# Patient Record
Sex: MALE | Race: WHITE | NOT HISPANIC OR LATINO | Employment: OTHER | URBAN - METROPOLITAN AREA
[De-identification: names, ages, dates, MRNs, and addresses within clinical notes are randomized per-mention and may not be internally consistent; named-entity substitution may affect disease eponyms.]

---

## 2018-10-10 ENCOUNTER — HOSPITAL ENCOUNTER (INPATIENT)
Facility: HOSPITAL | Age: 53
LOS: 2 days | Discharge: HOME | End: 2018-10-12
Attending: EMERGENCY MEDICINE | Admitting: INTERNAL MEDICINE

## 2018-10-10 ENCOUNTER — APPOINTMENT (OUTPATIENT)
Dept: GENERAL RADIOLOGY | Facility: HOSPITAL | Age: 53
End: 2018-10-10

## 2018-10-10 DIAGNOSIS — I21.3 ST ELEVATION MYOCARDIAL INFARCTION (STEMI), UNSPECIFIED ARTERY (HCC): Primary | ICD-10-CM

## 2018-10-10 PROBLEM — G89.29 CHRONIC BACK PAIN: Status: ACTIVE | Noted: 2018-10-10

## 2018-10-10 PROBLEM — M54.9 CHRONIC BACK PAIN: Status: ACTIVE | Noted: 2018-10-10

## 2018-10-10 PROBLEM — I10 HTN (HYPERTENSION): Status: ACTIVE | Noted: 2018-10-10

## 2018-10-10 PROBLEM — E78.5 HLD (HYPERLIPIDEMIA): Status: ACTIVE | Noted: 2018-10-10

## 2018-10-10 LAB
ALBUMIN SERPL-MCNC: 4.69 G/DL (ref 3.2–4.8)
ALBUMIN/GLOB SERPL: 2.2 G/DL (ref 1.5–2.5)
ALP SERPL-CCNC: 50 U/L (ref 25–100)
ALT SERPL W P-5'-P-CCNC: 26 U/L (ref 7–40)
ANION GAP SERPL CALCULATED.3IONS-SCNC: 12 MMOL/L (ref 3–11)
AST SERPL-CCNC: 29 U/L (ref 0–33)
BASOPHILS # BLD AUTO: 0.03 10*3/MM3 (ref 0–0.2)
BASOPHILS NFR BLD AUTO: 0.2 % (ref 0–1)
BILIRUB SERPL-MCNC: 1.6 MG/DL (ref 0.3–1.2)
BNP SERPL-MCNC: 102 PG/ML (ref 0–100)
BUN BLD-MCNC: 24 MG/DL (ref 9–23)
BUN/CREAT SERPL: 17.4 (ref 7–25)
CALCIUM SPEC-SCNC: 9.3 MG/DL (ref 8.7–10.4)
CHLORIDE SERPL-SCNC: 96 MMOL/L (ref 99–109)
CO2 SERPL-SCNC: 28 MMOL/L (ref 20–31)
CREAT BLD-MCNC: 1.38 MG/DL (ref 0.6–1.3)
DEPRECATED RDW RBC AUTO: 40.5 FL (ref 37–54)
EOSINOPHIL # BLD AUTO: 0.06 10*3/MM3 (ref 0–0.3)
EOSINOPHIL NFR BLD AUTO: 0.5 % (ref 0–3)
ERYTHROCYTE [DISTWIDTH] IN BLOOD BY AUTOMATED COUNT: 12.8 % (ref 11.3–14.5)
GFR SERPL CREATININE-BSD FRML MDRD: 54 ML/MIN/1.73
GLOBULIN UR ELPH-MCNC: 2.1 GM/DL
GLUCOSE BLD-MCNC: 157 MG/DL (ref 70–100)
HCT VFR BLD AUTO: 47.6 % (ref 38.9–50.9)
HGB BLD-MCNC: 16.2 G/DL (ref 13.1–17.5)
HOLD SPECIMEN: NORMAL
HOLD SPECIMEN: NORMAL
IMM GRANULOCYTES # BLD: 0.05 10*3/MM3 (ref 0–0.03)
IMM GRANULOCYTES NFR BLD: 0.4 % (ref 0–0.6)
LIPASE SERPL-CCNC: 30 U/L (ref 6–51)
LYMPHOCYTES # BLD AUTO: 1.38 10*3/MM3 (ref 0.6–4.8)
LYMPHOCYTES NFR BLD AUTO: 11.1 % (ref 24–44)
MCH RBC QN AUTO: 29.7 PG (ref 27–31)
MCHC RBC AUTO-ENTMCNC: 34 G/DL (ref 32–36)
MCV RBC AUTO: 87.2 FL (ref 80–99)
MONOCYTES # BLD AUTO: 1.51 10*3/MM3 (ref 0–1)
MONOCYTES NFR BLD AUTO: 12.1 % (ref 0–12)
NEUTROPHILS # BLD AUTO: 9.47 10*3/MM3 (ref 1.5–8.3)
NEUTROPHILS NFR BLD AUTO: 76.1 % (ref 41–71)
PLATELET # BLD AUTO: 168 10*3/MM3 (ref 150–450)
PMV BLD AUTO: 12.3 FL (ref 6–12)
POTASSIUM BLD-SCNC: 3.8 MMOL/L (ref 3.5–5.5)
PROT SERPL-MCNC: 6.8 G/DL (ref 5.7–8.2)
RBC # BLD AUTO: 5.46 10*6/MM3 (ref 4.2–5.76)
SODIUM BLD-SCNC: 136 MMOL/L (ref 132–146)
TROPONIN I SERPL-MCNC: 6.3 NG/ML
WBC NRBC COR # BLD: 12.45 10*3/MM3 (ref 3.5–10.8)
WHOLE BLOOD HOLD SPECIMEN: NORMAL
WHOLE BLOOD HOLD SPECIMEN: NORMAL

## 2018-10-10 PROCEDURE — 99233 SBSQ HOSP IP/OBS HIGH 50: CPT | Performed by: INTERNAL MEDICINE

## 2018-10-10 PROCEDURE — C1725 CATH, TRANSLUMIN NON-LASER: HCPCS | Performed by: INTERNAL MEDICINE

## 2018-10-10 PROCEDURE — 92941 PRQ TRLML REVSC TOT OCCL AMI: CPT | Performed by: INTERNAL MEDICINE

## 2018-10-10 PROCEDURE — 99223 1ST HOSP IP/OBS HIGH 75: CPT | Performed by: INTERNAL MEDICINE

## 2018-10-10 PROCEDURE — C9606 PERC D-E COR REVASC W AMI S: HCPCS | Performed by: INTERNAL MEDICINE

## 2018-10-10 PROCEDURE — 85025 COMPLETE CBC W/AUTO DIFF WBC: CPT | Performed by: EMERGENCY MEDICINE

## 2018-10-10 PROCEDURE — C1769 GUIDE WIRE: HCPCS | Performed by: INTERNAL MEDICINE

## 2018-10-10 PROCEDURE — B2111ZZ FLUOROSCOPY OF MULTIPLE CORONARY ARTERIES USING LOW OSMOLAR CONTRAST: ICD-10-PCS | Performed by: INTERNAL MEDICINE

## 2018-10-10 PROCEDURE — B240ZZ3 ULTRASONOGRAPHY OF SINGLE CORONARY ARTERY, INTRAVASCULAR: ICD-10-PCS | Performed by: INTERNAL MEDICINE

## 2018-10-10 PROCEDURE — 93458 L HRT ARTERY/VENTRICLE ANGIO: CPT | Performed by: INTERNAL MEDICINE

## 2018-10-10 PROCEDURE — C1874 STENT, COATED/COV W/DEL SYS: HCPCS | Performed by: INTERNAL MEDICINE

## 2018-10-10 PROCEDURE — 93005 ELECTROCARDIOGRAM TRACING: CPT | Performed by: EMERGENCY MEDICINE

## 2018-10-10 PROCEDURE — 83690 ASSAY OF LIPASE: CPT | Performed by: EMERGENCY MEDICINE

## 2018-10-10 PROCEDURE — C1753 CATH, INTRAVAS ULTRASOUND: HCPCS | Performed by: INTERNAL MEDICINE

## 2018-10-10 PROCEDURE — 99284 EMERGENCY DEPT VISIT MOD MDM: CPT

## 2018-10-10 PROCEDURE — C1894 INTRO/SHEATH, NON-LASER: HCPCS | Performed by: INTERNAL MEDICINE

## 2018-10-10 PROCEDURE — 92978 ENDOLUMINL IVUS OCT C 1ST: CPT | Performed by: INTERNAL MEDICINE

## 2018-10-10 PROCEDURE — 027034Z DILATION OF CORONARY ARTERY, ONE ARTERY WITH DRUG-ELUTING INTRALUMINAL DEVICE, PERCUTANEOUS APPROACH: ICD-10-PCS | Performed by: INTERNAL MEDICINE

## 2018-10-10 PROCEDURE — 25010000002 BIVALIRUDIN TRIFLUOROACETATE 250 MG RECONSTITUTED SOLUTION 1 EACH VIAL: Performed by: INTERNAL MEDICINE

## 2018-10-10 PROCEDURE — 71045 X-RAY EXAM CHEST 1 VIEW: CPT

## 2018-10-10 PROCEDURE — 84484 ASSAY OF TROPONIN QUANT: CPT | Performed by: EMERGENCY MEDICINE

## 2018-10-10 PROCEDURE — 4A023N7 MEASUREMENT OF CARDIAC SAMPLING AND PRESSURE, LEFT HEART, PERCUTANEOUS APPROACH: ICD-10-PCS | Performed by: INTERNAL MEDICINE

## 2018-10-10 PROCEDURE — 80053 COMPREHEN METABOLIC PANEL: CPT | Performed by: EMERGENCY MEDICINE

## 2018-10-10 PROCEDURE — B2151ZZ FLUOROSCOPY OF LEFT HEART USING LOW OSMOLAR CONTRAST: ICD-10-PCS | Performed by: INTERNAL MEDICINE

## 2018-10-10 PROCEDURE — 83880 ASSAY OF NATRIURETIC PEPTIDE: CPT | Performed by: EMERGENCY MEDICINE

## 2018-10-10 PROCEDURE — 25010000002 HEPARIN (PORCINE) PER 1000 UNITS: Performed by: EMERGENCY MEDICINE

## 2018-10-10 PROCEDURE — 25010000002 FENTANYL CITRATE (PF) 100 MCG/2ML SOLUTION: Performed by: INTERNAL MEDICINE

## 2018-10-10 PROCEDURE — 25010000002 MIDAZOLAM PER 1 MG: Performed by: INTERNAL MEDICINE

## 2018-10-10 PROCEDURE — C1887 CATHETER, GUIDING: HCPCS | Performed by: INTERNAL MEDICINE

## 2018-10-10 DEVICE — XIENCE SIERRA™ EVEROLIMUS ELUTING CORONARY STENT SYSTEM 3.00 MM X 23 MM / RAPID-EXCHANGE
Type: IMPLANTABLE DEVICE | Status: FUNCTIONAL
Brand: XIENCE SIERRA™

## 2018-10-10 RX ORDER — NITROGLYCERIN 0.4 MG/1
TABLET SUBLINGUAL
Status: DISCONTINUED | OUTPATIENT
Start: 2018-10-10 | End: 2018-10-12 | Stop reason: HOSPADM

## 2018-10-10 RX ORDER — SODIUM CHLORIDE 9 MG/ML
1 INJECTION, SOLUTION INTRAVENOUS CONTINUOUS
Status: ACTIVE | OUTPATIENT
Start: 2018-10-10 | End: 2018-10-11

## 2018-10-10 RX ORDER — TEMAZEPAM 15 MG/1
15 CAPSULE ORAL NIGHTLY PRN
COMMUNITY
End: 2018-11-07

## 2018-10-10 RX ORDER — NITROGLYCERIN 20 MG/100ML
INJECTION INTRAVENOUS
Status: DISCONTINUED | OUTPATIENT
Start: 2018-10-10 | End: 2018-10-12 | Stop reason: HOSPADM

## 2018-10-10 RX ORDER — ONDANSETRON 4 MG/1
4 TABLET, FILM COATED ORAL EVERY 6 HOURS PRN
Status: DISCONTINUED | OUTPATIENT
Start: 2018-10-10 | End: 2018-10-12 | Stop reason: HOSPADM

## 2018-10-10 RX ORDER — GABAPENTIN 600 MG/1
600 TABLET ORAL 3 TIMES DAILY
COMMUNITY

## 2018-10-10 RX ORDER — ASPIRIN 81 MG/1
81 TABLET, CHEWABLE ORAL DAILY
Status: DISCONTINUED | OUTPATIENT
Start: 2018-10-11 | End: 2018-10-12 | Stop reason: HOSPADM

## 2018-10-10 RX ORDER — ACETAMINOPHEN 325 MG/1
650 TABLET ORAL EVERY 4 HOURS PRN
Status: DISCONTINUED | OUTPATIENT
Start: 2018-10-10 | End: 2018-10-12 | Stop reason: HOSPADM

## 2018-10-10 RX ORDER — ONDANSETRON 2 MG/ML
4 INJECTION INTRAMUSCULAR; INTRAVENOUS EVERY 6 HOURS PRN
Status: DISCONTINUED | OUTPATIENT
Start: 2018-10-10 | End: 2018-10-12 | Stop reason: HOSPADM

## 2018-10-10 RX ORDER — ALPRAZOLAM 0.25 MG/1
0.25 TABLET ORAL 3 TIMES DAILY PRN
Status: DISCONTINUED | OUTPATIENT
Start: 2018-10-10 | End: 2018-10-12 | Stop reason: HOSPADM

## 2018-10-10 RX ORDER — SODIUM CHLORIDE 0.9 % (FLUSH) 0.9 %
10 SYRINGE (ML) INJECTION AS NEEDED
Status: DISCONTINUED | OUTPATIENT
Start: 2018-10-10 | End: 2018-10-12 | Stop reason: HOSPADM

## 2018-10-10 RX ORDER — ASPIRIN 81 MG/1
324 TABLET, CHEWABLE ORAL ONCE
Status: DISCONTINUED | OUTPATIENT
Start: 2018-10-10 | End: 2018-10-11

## 2018-10-10 RX ORDER — FAMOTIDINE 20 MG/1
20 TABLET, FILM COATED ORAL DAILY
Status: DISCONTINUED | OUTPATIENT
Start: 2018-10-10 | End: 2018-10-12 | Stop reason: HOSPADM

## 2018-10-10 RX ORDER — CLOPIDOGREL BISULFATE 75 MG/1
TABLET ORAL
Status: DISCONTINUED | OUTPATIENT
Start: 2018-10-10 | End: 2018-10-12 | Stop reason: HOSPADM

## 2018-10-10 RX ORDER — DIPHENHYDRAMINE HYDROCHLORIDE 50 MG/ML
25 INJECTION INTRAMUSCULAR; INTRAVENOUS EVERY 6 HOURS PRN
Status: DISCONTINUED | OUTPATIENT
Start: 2018-10-10 | End: 2018-10-12 | Stop reason: HOSPADM

## 2018-10-10 RX ORDER — MIDAZOLAM HYDROCHLORIDE 1 MG/ML
INJECTION INTRAMUSCULAR; INTRAVENOUS AS NEEDED
Status: DISCONTINUED | OUTPATIENT
Start: 2018-10-10 | End: 2018-10-10 | Stop reason: HOSPADM

## 2018-10-10 RX ORDER — ALLOPURINOL 100 MG/1
100 TABLET ORAL DAILY PRN
COMMUNITY

## 2018-10-10 RX ORDER — COLCHICINE 0.6 MG/1
0.6 TABLET ORAL DAILY PRN
COMMUNITY

## 2018-10-10 RX ORDER — SODIUM CHLORIDE 9 MG/ML
INJECTION, SOLUTION INTRAVENOUS CONTINUOUS PRN
Status: COMPLETED | OUTPATIENT
Start: 2018-10-10 | End: 2018-10-10

## 2018-10-10 RX ORDER — FENTANYL CITRATE 50 UG/ML
INJECTION, SOLUTION INTRAMUSCULAR; INTRAVENOUS AS NEEDED
Status: DISCONTINUED | OUTPATIENT
Start: 2018-10-10 | End: 2018-10-10 | Stop reason: HOSPADM

## 2018-10-10 RX ORDER — HEPARIN SODIUM 5000 [USP'U]/ML
INJECTION, SOLUTION INTRAVENOUS; SUBCUTANEOUS
Status: DISCONTINUED | OUTPATIENT
Start: 2018-10-10 | End: 2018-10-12 | Stop reason: HOSPADM

## 2018-10-10 RX ORDER — HYDROCODONE BITARTRATE AND ACETAMINOPHEN 10; 325 MG/1; MG/1
1 TABLET ORAL 3 TIMES DAILY
COMMUNITY

## 2018-10-10 RX ORDER — CALCIUM CARBONATE 200(500)MG
2 TABLET,CHEWABLE ORAL 2 TIMES DAILY PRN
Status: DISCONTINUED | OUTPATIENT
Start: 2018-10-10 | End: 2018-10-12 | Stop reason: HOSPADM

## 2018-10-10 RX ORDER — HYDROCODONE BITARTRATE AND ACETAMINOPHEN 5; 325 MG/1; MG/1
1 TABLET ORAL EVERY 4 HOURS PRN
Status: DISCONTINUED | OUTPATIENT
Start: 2018-10-10 | End: 2018-10-12 | Stop reason: HOSPADM

## 2018-10-10 RX ORDER — LIDOCAINE HYDROCHLORIDE 10 MG/ML
INJECTION, SOLUTION EPIDURAL; INFILTRATION; INTRACAUDAL; PERINEURAL AS NEEDED
Status: DISCONTINUED | OUTPATIENT
Start: 2018-10-10 | End: 2018-10-10 | Stop reason: HOSPADM

## 2018-10-10 RX ADMIN — TICAGRELOR 90 MG: 90 TABLET ORAL at 20:43

## 2018-10-10 RX ADMIN — HYDROCODONE BITARTRATE AND ACETAMINOPHEN 1 TABLET: 5; 325 TABLET ORAL at 20:43

## 2018-10-10 RX ADMIN — SODIUM CHLORIDE 1 ML/KG/HR: 9 INJECTION, SOLUTION INTRAVENOUS at 18:45

## 2018-10-10 RX ADMIN — CLOPIDOGREL BISULFATE 300 MG: 75 TABLET ORAL at 16:23

## 2018-10-10 RX ADMIN — HEPARIN SODIUM 7320 UNITS: 5000 INJECTION, SOLUTION INTRAVENOUS; SUBCUTANEOUS at 16:24

## 2018-10-10 RX ADMIN — FAMOTIDINE 20 MG: 20 TABLET ORAL at 20:43

## 2018-10-10 RX ADMIN — NITROGLYCERIN 0.4 MG: 0.4 TABLET SUBLINGUAL at 16:25

## 2018-10-10 RX ADMIN — NITROGLYCERIN 10 MCG/MIN: 20 INJECTION INTRAVENOUS at 16:26

## 2018-10-10 RX ADMIN — METOPROLOL TARTRATE 12.5 MG: 25 TABLET, FILM COATED ORAL at 20:44

## 2018-10-10 NOTE — PROGRESS NOTES
Intensive Care Follow-up      LOS: 0 days     Mr. Lenny Esparza, 53 y.o. male is followed for: ST elevation myocardial infarction (STEMI) (CMS/HCC)     Subjective - Interval History     Mr. Esparza has a PMH significant for lymphoma (in remission per patient), HTN and hyperlipidemia. He has been intolerant to Simvistatin, rosuvastatin, and pravastatin due to severe myalgias. He has had  HLD since he was a teenager and has tried to control through diet.     Over the past week he has been awakened on a nightly basis with chest tightness that would resolve without intervention. He had some exertional chest pain which did not radiate and resolved with rest . The pain was associated with shortness of breath and diaphoresis but no nausea or vomiting. He has had extreme fatigue in the past couple of weeks as well. The only family history of CAD was his mother who  2 years ago at the age of 70 due to MI. He was out of town working and forgot to his BP medicine and did not take it for a week.     On Monday he had fever and chills and went to see his primary doctor today. EKG was suspicious for STEMI and he was sent to ED and then to cath lab where a JULI was placed in the LAD. He was also found to have a 60% RCA and will have intervention per Dr. Andre on approximately 4 weeks. EF was 50%. At the time of heart cath.      He denies wheezing, cough, palpitations,     The patient's relevant past medical, surgical and social history were reviewed and updated in Epic as appropriate.     Objective     Infusions:    nitroglycerin  Last Rate: Stopped (10/10/18 4511)   sodium chloride 1 mL/kg/hr      Medications:    aspirin 324 mg Oral Once   [START ON 10/11/2018] aspirin 81 mg Oral Daily   metoprolol tartrate 12.5 mg Oral Q12H   ticagrelor 90 mg Oral BID     Intake/Output     None        Vital Sign Min/Max for last 24 hours  Temp  Min: 98.3 °F (36.8 °C)  Max: 98.3 °F (36.8 °C)   BP  Min: 119/74  Max: 149/80   Pulse  Min: 78  Max:  97   Resp  Min: 18  Max: 18   SpO2  Min: 93 %  Max: 96 %   No Data Recorded        Physical Exam:   GENERAL: Lying in bed in NAD   HEENT: No JVD   LUNGS: Clear and equal bilaterally   HEART: Regular rate and rhythm, S1 S2, no murmur, rub, or gallop   ABDOMEN: Soft, non-tender, non-distended, active bowel sounds   EXTREMITIES: No clubbing, cyanosis, or edema, palpable pulses, right wrist cath site with TR band in place, no hematoma or bleeding   NEURO/PSYCH: Alert and oriented with no focal neurologic deficits      Results from last 7 days  Lab Units 10/10/18  1622   WBC 10*3/mm3 12.45*   HEMOGLOBIN g/dL 16.2   PLATELETS 10*3/mm3 168       Results from last 7 days  Lab Units 10/10/18  1622   SODIUM mmol/L 136   POTASSIUM mmol/L 3.8   CO2 mmol/L 28.0   BUN mg/dL 24*   CREATININE mg/dL 1.38*   GLUCOSE mg/dL 157*     Estimated Creatinine Clearance: 84.2 mL/min (A) (by C-G formula based on SCr of 1.38 mg/dL (H)).              No results found for: LACTATE       Images:   Xr Chest 1 View    Result Date: 10/10/2018  No acute cardiopulmonary abnormality.  DICTATED:   10/10/2018 EDITED/ls :   10/10/2018           I reviewed the patient's results and images.     Impression      Active Hospital Problems    Diagnosis   • **ST elevation myocardial infarction (STEMI) (CMS/HCC)   • HTN (hypertension)   • HLD (hyperlipidemia)   • Chronic back pain            Plan        STEMI- Monitor in ICU, dual antiplatelet therapy (Brilinta/ASA), BBl per cardiology. Monitor for arrythmia. Await echocardiogram results. Lifestyle modifications, especially discontinuing use of smokeless tobacco products, control of HLD.  Follow troponins.     HTN- BBl, possible ACE-I per cardiology    HLD- Intolerant to statin drugs due to myalgias, possible candidate for Repatha    Chronic back pain- Continue home Norco and gabapentin    Elevated creatinine- Fluids, follow trend    GI prophylaxis     Plan of care and goals reviewed with mulitdisciplinary team at  daily rounds   I discussed the patient's findings and my recommendations with patient, family and nursing staff       LINNETTE Rivera, [facility]  Pulmonary & Critical Care    I have seen and examined the patient, performing a face-to-face diagnostic evaluation with plan of care reviewed and developed with APRN and nursing staff. I have addended and modified the above history of present illness, physical examination, and assessment and plan to reflect my findings and impressions.    Ethan Jackson MD  Pulmonology and Critical Care Medicine  10/10/18 7:30 PM  Electronically Signed

## 2018-10-10 NOTE — H&P
[facility] History and Physical Note    Lenny Esparza  [id number]  1965   LOS: 0 days   Patient Care Team:  Provider, No Known as PCP - General    Mr. Esparza is a 52-year-old  white male from [place], [place].    Chief Complaint:  Chest pain, STEMI         Problem List:  1. STEMI 10/10/18  2. Hypertension  3. Hyperlipidemia    No Known Allergies  No prescriptions prior to admission.     Scheduled Meds:  Continuous Infusions:  No current facility-administered medications for this encounter.   PRN Meds:.       History of Present Illness:    This is a 53-year-old white male who presents with a STEMI.  He apparently had a STEMI on ECG at outside facility earlier today.  He elected to drive himself (possibly his wife was driving) to the [facility] ED to the cath lab for intervention.  He was given ASA, clopidogrel 300mg, and nitro at the clinic. The patient had been having chest pressure off and on with exertion for the past 6 days.  He had associated shortness of breath and diaphoresis, but denied any nausea, vomiting, palpitations, or presyncope.  He denies any prior cardiac problems.  He has hypertension and hyperlipidemia, but denies any type 2 diabetes mellitus.  He has no family history of early CAD.  He has never been a smoker. Procedure was explained to patient and he was agreeable to proceed. He does not have a prior cardiologist, but his wife thought that he may have seen Dr. Gr in the past-data deficit.  There are no records in Epic indicating this.    Cardiac risk factors: dyslipidemia, hypertension, male gender and obesity (BMI >= 30 kg/m2).    Social History     Social History   • Marital status:      Spouse name: N/A   • Number of children: N/A   • Years of education: N/A     Occupational History   • Not on file.     Social History Main Topics   • Smoking status: Not on file   • Smokeless tobacco: Not on file   • Alcohol use Not on file   • Drug use: Unknown   • Sexual activity:  Not on file     Other Topics Concern   • Not on file     Social History Narrative   • No narrative on file     No family history on file.    Review of Systems  Pertinent items are noted in HPI and problem list.          Objective:       Physical Exam  /80, HR 97, RR 18, O2 sat 96% on room air, weight 122kg, height 180cm    General Appearance:  Alert, cooperative, no distress, appears stated age   Head:  Normocephalic, without obvious abnormality, atraumatic   Neck: Supple, symmetrical, trachea midline, no adenopathy, thyroid: not enlarged, symmetric, no tenderness/mass/nodules, no carotid bruit or JVD   Lungs:   Clear to auscultation bilaterally, respirations unlabored   Heart:  RRR, S1, S2 normal, no murmur, rub or gallop   Abdomen:   Soft, non-tender, no masses, no organomegaly, bowel sounds audible x4   Extremities: No edema, normal range of motion   Pulses: 2+ and symmetric   Skin: Skin color, texture, turgor normal, no rashes or lesions   Neurologic: Normal       Cardi:ographics  EKG:10/10/18:Sinus rhythm with premature supraventricular complexes and with occasional premature ventricular complexes  Right bundle branch block  Inferior infarct , age undetermined  Anteroseptal infarct , age undetermined  Abnormal ECG  No previous ECGs available  ECG at OSH 10/10/18: Sinus rhythm, leftward axis, RBBB, inferior infarct, lateral ST elevation, 77 bpm,  ms,  ms,  ms    Imaging  Chest x-ray:none to review    Lab Review   Pending at time of note writing                  Assessment:   - STEMI and ongoing chest pain.   - History of hypertension  - History of dyslipidemia         Plan:     - Emergent C    Scribed for Giovanny Andre MD by LINNETTE Dodge. 10/10/2018  4:43 PM    I, Giovanny Andre MD, personally performed the services as scribed by the above named individual. I have made any necessary edits and it is both accurate and complete.     Giovanny Andre MD, MSc,  [facility]  Interventional Cardiology  [facility]    Addendum  Heart cath results and recommendations:  IMPRESSION:  · There is a 100% stenosis of the mid LAD that is now status post intervention with a Xience Summer 3.0 x 23 mm drug-eluting stent and post dilatation with a 3.5 mm noncompliant balloon.  · There is a residual 60% tubular proximal RCA stenosis as well as a Alonso class (1,1,1) bifurcation stenosis in the distal RCA at the bifurcation of the RPL S and RPDA   · Left ventricular ejection fraction 50% with hypokinesis of the anterior and apical segments.  · Frequent PVCs noted throughout the study    RECOMMENDATIONS:  · Clinical monitoring in the intensive care unit with close attention to the patient's rhythm on telemetry due to frequent PVCs and in the setting of an anterior wall MI  · Transthoracic echocardiogram  · Normal saline at 1 ml/hour for 6 hours in the setting of an elevated creatinine  · Dual antiplatelet therapy  · Beta blocker therapy  · Possible ACE inhibitor therapy depending on kidney function and echocardiogram results  · Discussion of candidacy for a PCS K-9 inhibitor in view of a previous intolerance to 3 different statins including simvastatin, rosuvastatin, pravastatin  · Cardiac rehabilitation  · Aggressive lifestyle risk factor modifications for CAD  · Staged intervention of the RCA in approximately 4 weeks     Giovanny Andre MD, MSc, [facility]  Interventional Cardiology  [facility]

## 2018-10-10 NOTE — ED PROVIDER NOTES
Subjective   Lenny Esparza is a 53 y.o.male who presents to the emergency department with complaints of substernal and left sided chest pain, tightness, and pressure which started last week and has been off and on for the last few days but continuous since this morning. He was seen by his PCP this morning who wanted to call EMS but the patient refused and drove about 80 miles from [place], [place] with his wife. He received one sublingual Nitroglycerin, 300 mg of Plavix, 81 mg of aspirin, and a GI cocktail at the office prior to arrival. He has been diaphoretic but denies nausea or shortness of breath. The only medication that he takes on a regular basis is Lisinopril for hypertension. There are no other acute complaints at this time.        History provided by:  Patient and spouse  History limited by:  Acuity of condition  Chest Pain   Pain location:  L chest and substernal area  Pain quality: pressure and tightness    Pain radiates to:  Does not radiate  Pain severity:  Moderate  Onset quality:  Gradual  Duration:  1 week  Timing:  Intermittent  Progression:  Worsening  Chronicity:  New  Relieved by:  Nothing  Worsened by:  Nothing  Ineffective treatments:  Aspirin and nitroglycerin (Plavix, GI cocktail)  Associated symptoms: diaphoresis    Associated symptoms: no nausea and no shortness of breath    Risk factors: hypertension and male sex        Review of Systems   Unable to perform ROS: Acuity of condition   Constitutional: Positive for diaphoresis.   Respiratory: Negative for shortness of breath.    Cardiovascular: Positive for chest pain.   Gastrointestinal: Negative for nausea.       No past medical history on file.    No Known Allergies    No past surgical history on file.    No family history on file.    Social History     Social History   • Marital status:      Social History Main Topics   • Drug use: Unknown     Other Topics Concern   • Not on file         Objective   Physical Exam   Constitutional: He  is oriented to person, place, and time. He appears well-developed and well-nourished. No distress.   Mildly diaphoretic.   HENT:   Head: Normocephalic and atraumatic.   Eyes: Conjunctivae are normal. No scleral icterus.   Neck: Normal range of motion. Neck supple.   Cardiovascular: Normal rate, regular rhythm and normal heart sounds.    No murmur heard.  Pulmonary/Chest: Effort normal and breath sounds normal. No respiratory distress.   Abdominal: Soft. Bowel sounds are normal. There is no tenderness. There is no rebound and no guarding.   Musculoskeletal: He exhibits no edema.   Neurological: He is alert and oriented to person, place, and time.   Skin: Skin is warm. He is diaphoretic. No erythema.   Warm. Normal color.   Psychiatric: He has a normal mood and affect. His behavior is normal.   Nursing note and vitals reviewed.      Critical Care  Performed by: DEMETRI FERNANDEZ  Authorized by: DEMETRI FERNANDEZ     Critical care provider statement:     Critical care time (minutes):  15    Critical care time was exclusive of:  Separately billable procedures and treating other patients    Critical care was necessary to treat or prevent imminent or life-threatening deterioration of the following conditions:  Cardiac failure    Critical care was time spent personally by me on the following activities:  Evaluation of patient's response to treatment, examination of patient, obtaining history from patient or surrogate, ordering and performing treatments and interventions, ordering and review of laboratory studies, ordering and review of radiographic studies, re-evaluation of patient's condition, development of treatment plan with patient or surrogate, pulse oximetry and discussions with consultants               ED Course     Recent Results (from the past 24 hour(s))   Comprehensive Metabolic Panel    Collection Time: 10/10/18  4:22 PM   Result Value Ref Range    Glucose 157 (H) 70 - 100 mg/dL    BUN 24 (H) 9 - 23 mg/dL     Creatinine 1.38 (H) 0.60 - 1.30 mg/dL    Sodium 136 132 - 146 mmol/L    Potassium 3.8 3.5 - 5.5 mmol/L    Chloride 96 (L) 99 - 109 mmol/L    CO2 28.0 20.0 - 31.0 mmol/L    Calcium 9.3 8.7 - 10.4 mg/dL    Total Protein 6.8 5.7 - 8.2 g/dL    Albumin 4.69 3.20 - 4.80 g/dL    ALT (SGPT) 26 7 - 40 U/L    AST (SGOT) 29 0 - 33 U/L    Alkaline Phosphatase 50 25 - 100 U/L    Total Bilirubin 1.6 (H) 0.3 - 1.2 mg/dL    eGFR Non African Amer 54 (L) >60 mL/min/1.73    Globulin 2.1 gm/dL    A/G Ratio 2.2 1.5 - 2.5 g/dL    BUN/Creatinine Ratio 17.4 7.0 - 25.0    Anion Gap 12.0 (H) 3.0 - 11.0 mmol/L   Lipase    Collection Time: 10/10/18  4:22 PM   Result Value Ref Range    Lipase 30 6 - 51 U/L   BNP    Collection Time: 10/10/18  4:22 PM   Result Value Ref Range    .0 (H) 0.0 - 100.0 pg/mL   CBC Auto Differential    Collection Time: 10/10/18  4:22 PM   Result Value Ref Range    WBC 12.45 (H) 3.50 - 10.80 10*3/mm3    RBC 5.46 4.20 - 5.76 10*6/mm3    Hemoglobin 16.2 13.1 - 17.5 g/dL    Hematocrit 47.6 38.9 - 50.9 %    MCV 87.2 80.0 - 99.0 fL    MCH 29.7 27.0 - 31.0 pg    MCHC 34.0 32.0 - 36.0 g/dL    RDW 12.8 11.3 - 14.5 %    RDW-SD 40.5 37.0 - 54.0 fl    MPV 12.3 (H) 6.0 - 12.0 fL    Platelets 168 150 - 450 10*3/mm3    Neutrophil % 76.1 (H) 41.0 - 71.0 %    Lymphocyte % 11.1 (L) 24.0 - 44.0 %    Monocyte % 12.1 (H) 0.0 - 12.0 %    Eosinophil % 0.5 0.0 - 3.0 %    Basophil % 0.2 0.0 - 1.0 %    Immature Grans % 0.4 0.0 - 0.6 %    Neutrophils, Absolute 9.47 (H) 1.50 - 8.30 10*3/mm3    Lymphocytes, Absolute 1.38 0.60 - 4.80 10*3/mm3    Monocytes, Absolute 1.51 (H) 0.00 - 1.00 10*3/mm3    Eosinophils, Absolute 0.06 0.00 - 0.30 10*3/mm3    Basophils, Absolute 0.03 0.00 - 0.20 10*3/mm3    Immature Grans, Absolute 0.05 (H) 0.00 - 0.03 10*3/mm3   Troponin    Collection Time: 10/10/18  4:22 PM   Result Value Ref Range    Troponin I 6.303 (C) <=0.039 ng/mL     Note: In addition to lab results from this visit, the labs listed above may  "include labs taken at another facility or during a different encounter within the last 24 hours. Please correlate lab times with ED admission and discharge times for further clarification of the services performed during this visit.    XR Chest 1 View    (Results Pending)     Vitals:    10/10/18 1615 10/10/18 1626 10/10/18 1633   BP:  149/80 140/67   Pulse:  97 89   Resp:  18 18   SpO2:  96% 93%   Weight: 122 kg (268 lb)     Height: 180.3 cm (71\")       Medications   sodium chloride 0.9 % flush 10 mL (not administered)   aspirin chewable tablet 324 mg (324 mg Oral Not Given 10/10/18 1620)   clopidogrel (PLAVIX) tablet (300 mg Oral Given 10/10/18 1623)   heparin (porcine) 5000 UNIT/ML injection (7,320 Units Intravenous Given 10/10/18 1624)   nitroglycerin (NITROSTAT) SL tablet (0.4 mg Sublingual Given 10/10/18 1625)   nitroglycerin 50 mg/250 mL (0.2 mg/mL) infusion (10 mcg/min Intravenous New Bag 10/10/18 1626)   lidocaine PF 1% (XYLOCAINE) injection (5 mL  Given 10/10/18 1637)   nicardipine (CARDENE) 100 MCG/ mcg, nitroglycerin 400 mcg radial artery injection ( Intra-arterial Given 10/10/18 1638)   midazolam (VERSED) injection (1 mg Intravenous Given 10/10/18 1657)   fentaNYL citrate (PF) (SUBLIMAZE) injection (25 mcg Intravenous Given 10/10/18 1656)   sodium chloride 0.9 % infusion (250 mL Intravenous New Bag 10/10/18 1648)   O2 (OXYGEN) (2 L Inhalation Given 10/10/18 1649)   bivalirudin (ANGIOMAX) bolus from bag (91.5 mg Intravenous Given 10/10/18 1654)   Bivalirudin Trifluoroacetate (ANGIOMAX) 250 mg in sodium chloride 0.9 % 100 mL (2.5 mg/mL) infusion (1.75 mg/kg/hr × 122 kg Intravenous New Bag 10/10/18 1656)   nitroglycerin 100 mcg/mL 30 mL syringe (200 mcg Intra-coronary Given 10/10/18 1659)     ECG/EMG Results (last 24 hours)     Procedure Component Value Units Date/Time    ECG 12 Lead [[id number]] Collected:  10/10/18 1614     Updated:  10/10/18 1615         ECG 12 Lead   Final Result   Test Reason : " chest pain   Blood Pressure : **/** mmHG   Vent. Rate : 085 BPM     Atrial Rate : 085 BPM      P-R Int : 130 ms          QRS Dur : 120 ms       QT Int : 384 ms       P-R-T Axes : -04 -28 010 degrees      QTc Int : 456 ms      Sinus rhythm with premature supraventricular complexes and with occasional       premature ventricular complexes   Right bundle branch block   Inferior infarct , age undetermined   Anteroseptal infarct , age undetermined   Abnormal ECG   No previous ECGs available   Confirmed by DEMETRI FERNANDEZ MD ([id number]) on 10/10/2018 9:29:56 PM      Referred By:  EDMD           Confirmed By:DEMETRI FERNANDEZ MD                      MDM  Number of Diagnoses or Management Options  Critical Care  Total time providing critical care: < 30 minutes      Final diagnoses:   ST elevation myocardial infarction (STEMI), unspecified artery (CMS/HCC)       Documentation assistance provided by nargis Mason.  Information recorded by the scribe was done at my direction and has been verified and validated by me.     Yamileth Mason  10/10/18 7451       Demetri Fernandez MD  10/10/18 6220

## 2018-10-11 ENCOUNTER — APPOINTMENT (OUTPATIENT)
Dept: CARDIOLOGY | Facility: HOSPITAL | Age: 53
End: 2018-10-11
Attending: INTERNAL MEDICINE

## 2018-10-11 LAB
ANION GAP SERPL CALCULATED.3IONS-SCNC: 7 MMOL/L (ref 3–11)
ARTICHOKE IGE QN: 105 MG/DL (ref 0–130)
BUN BLD-MCNC: 19 MG/DL (ref 9–23)
BUN/CREAT SERPL: 17.8 (ref 7–25)
CALCIUM SPEC-SCNC: 8.6 MG/DL (ref 8.7–10.4)
CHLORIDE SERPL-SCNC: 102 MMOL/L (ref 99–109)
CHOLEST SERPL-MCNC: 146 MG/DL (ref 0–200)
CO2 SERPL-SCNC: 25 MMOL/L (ref 20–31)
CREAT BLD-MCNC: 1.07 MG/DL (ref 0.6–1.3)
DEPRECATED RDW RBC AUTO: 40.6 FL (ref 37–54)
ERYTHROCYTE [DISTWIDTH] IN BLOOD BY AUTOMATED COUNT: 12.7 % (ref 11.3–14.5)
GFR SERPL CREATININE-BSD FRML MDRD: 73 ML/MIN/1.73
GLUCOSE BLD-MCNC: 118 MG/DL (ref 70–100)
HBA1C MFR BLD: 6.3 % (ref 4.8–5.6)
HCT VFR BLD AUTO: 44.1 % (ref 38.9–50.9)
HDLC SERPL-MCNC: 28 MG/DL (ref 40–60)
HGB BLD-MCNC: 14.7 G/DL (ref 13.1–17.5)
MCH RBC QN AUTO: 29.2 PG (ref 27–31)
MCHC RBC AUTO-ENTMCNC: 33.3 G/DL (ref 32–36)
MCV RBC AUTO: 87.5 FL (ref 80–99)
PLATELET # BLD AUTO: 129 10*3/MM3 (ref 150–450)
PMV BLD AUTO: 12 FL (ref 6–12)
POTASSIUM BLD-SCNC: 3.9 MMOL/L (ref 3.5–5.5)
RBC # BLD AUTO: 5.04 10*6/MM3 (ref 4.2–5.76)
SODIUM BLD-SCNC: 134 MMOL/L (ref 132–146)
TRIGL SERPL-MCNC: 113 MG/DL (ref 0–150)
WBC NRBC COR # BLD: 8.81 10*3/MM3 (ref 3.5–10.8)

## 2018-10-11 PROCEDURE — 93321 DOPPLER ECHO F-UP/LMTD STD: CPT | Performed by: INTERNAL MEDICINE

## 2018-10-11 PROCEDURE — 85027 COMPLETE CBC AUTOMATED: CPT | Performed by: INTERNAL MEDICINE

## 2018-10-11 PROCEDURE — 93321 DOPPLER ECHO F-UP/LMTD STD: CPT

## 2018-10-11 PROCEDURE — 93325 DOPPLER ECHO COLOR FLOW MAPG: CPT | Performed by: INTERNAL MEDICINE

## 2018-10-11 PROCEDURE — 93325 DOPPLER ECHO COLOR FLOW MAPG: CPT

## 2018-10-11 PROCEDURE — 99232 SBSQ HOSP IP/OBS MODERATE 35: CPT | Performed by: INTERNAL MEDICINE

## 2018-10-11 PROCEDURE — 93308 TTE F-UP OR LMTD: CPT | Performed by: INTERNAL MEDICINE

## 2018-10-11 PROCEDURE — 93005 ELECTROCARDIOGRAM TRACING: CPT | Performed by: INTERNAL MEDICINE

## 2018-10-11 PROCEDURE — 93308 TTE F-UP OR LMTD: CPT

## 2018-10-11 PROCEDURE — 83036 HEMOGLOBIN GLYCOSYLATED A1C: CPT | Performed by: INTERNAL MEDICINE

## 2018-10-11 PROCEDURE — 25010000002 SULFUR HEXAFLUORIDE MICROSPH 60.7-25 MG RECONSTITUTED SUSPENSION: Performed by: INTERNAL MEDICINE

## 2018-10-11 PROCEDURE — 80061 LIPID PANEL: CPT | Performed by: INTERNAL MEDICINE

## 2018-10-11 PROCEDURE — 93010 ELECTROCARDIOGRAM REPORT: CPT | Performed by: INTERNAL MEDICINE

## 2018-10-11 PROCEDURE — 80048 BASIC METABOLIC PNL TOTAL CA: CPT | Performed by: INTERNAL MEDICINE

## 2018-10-11 RX ORDER — GABAPENTIN 400 MG/1
400 CAPSULE ORAL EVERY 8 HOURS SCHEDULED
Status: DISCONTINUED | OUTPATIENT
Start: 2018-10-11 | End: 2018-10-12 | Stop reason: HOSPADM

## 2018-10-11 RX ORDER — TEMAZEPAM 15 MG/1
15 CAPSULE ORAL NIGHTLY PRN
Status: CANCELLED | OUTPATIENT
Start: 2018-10-11

## 2018-10-11 RX ORDER — LISINOPRIL 5 MG/1
5 TABLET ORAL
Status: DISCONTINUED | OUTPATIENT
Start: 2018-10-12 | End: 2018-10-12 | Stop reason: HOSPADM

## 2018-10-11 RX ORDER — CLOPIDOGREL BISULFATE 75 MG/1
75 TABLET ORAL DAILY
Status: DISCONTINUED | OUTPATIENT
Start: 2018-10-12 | End: 2018-10-12 | Stop reason: HOSPADM

## 2018-10-11 RX ADMIN — ALPRAZOLAM 0.25 MG: 0.25 TABLET ORAL at 22:33

## 2018-10-11 RX ADMIN — ASPIRIN 81 MG 81 MG: 81 TABLET ORAL at 08:32

## 2018-10-11 RX ADMIN — GABAPENTIN 400 MG: 400 CAPSULE ORAL at 14:12

## 2018-10-11 RX ADMIN — GABAPENTIN 400 MG: 400 CAPSULE ORAL at 22:33

## 2018-10-11 RX ADMIN — HYDROCODONE BITARTRATE AND ACETAMINOPHEN 1 TABLET: 5; 325 TABLET ORAL at 22:33

## 2018-10-11 RX ADMIN — METOPROLOL TARTRATE 12.5 MG: 25 TABLET, FILM COATED ORAL at 20:06

## 2018-10-11 RX ADMIN — TICAGRELOR 90 MG: 90 TABLET ORAL at 08:33

## 2018-10-11 RX ADMIN — HYDROCODONE BITARTRATE AND ACETAMINOPHEN 1 TABLET: 5; 325 TABLET ORAL at 11:49

## 2018-10-11 RX ADMIN — ALPRAZOLAM 0.25 MG: 0.25 TABLET ORAL at 00:40

## 2018-10-11 RX ADMIN — METOPROLOL TARTRATE 12.5 MG: 25 TABLET, FILM COATED ORAL at 08:32

## 2018-10-11 RX ADMIN — SULFUR HEXAFLUORIDE 4 ML: KIT at 11:10

## 2018-10-11 RX ADMIN — HYDROCODONE BITARTRATE AND ACETAMINOPHEN 1 TABLET: 5; 325 TABLET ORAL at 18:34

## 2018-10-11 RX ADMIN — TICAGRELOR 90 MG: 90 TABLET ORAL at 20:06

## 2018-10-11 RX ADMIN — RIVAROXABAN 20 MG: 20 TABLET, FILM COATED ORAL at 20:06

## 2018-10-11 RX ADMIN — FAMOTIDINE 20 MG: 20 TABLET ORAL at 08:32

## 2018-10-11 NOTE — PROGRESS NOTES
Brief Note  -LV thrombus on TTE  -Will switch to ASA, clopidogrel and Xarelto for the time being  -Last dose of ticagrelor tonight, start Xarelto tonight, start clopidogrel tomorrow    Giovanny Andre MD, MSc, [facility]  Interventional Cardiology  [facility]

## 2018-10-11 NOTE — PLAN OF CARE
Problem: Patient Care Overview  Goal: Plan of Care Review  Outcome: Ongoing (interventions implemented as appropriate)   10/11/18 8882   Coping/Psychosocial   Plan of Care Reviewed With patient;spouse   Plan of Care Review   Progress improving   OTHER   Outcome Summary Pt. VSS. Intermittent pain r/t chronic low back pain. Home meds restarted. SR/SB with ST elevation and frequent PVCs. Orders to telemetry.     Goal: Individualization and Mutuality  Outcome: Ongoing (interventions implemented as appropriate)    Goal: Discharge Needs Assessment  Outcome: Ongoing (interventions implemented as appropriate)    Goal: Interprofessional Rounds/Family Conf  Outcome: Ongoing (interventions implemented as appropriate)

## 2018-10-11 NOTE — PLAN OF CARE
Problem: Skin Injury Risk (Adult)  Goal: Identify Related Risk Factors and Signs and Symptoms  Outcome: Ongoing (interventions implemented as appropriate)   10/11/18 0450   Skin Injury Risk (Adult)   Related Risk Factors (Skin Injury Risk) critical care admission     Goal: Skin Health and Integrity  Outcome: Ongoing (interventions implemented as appropriate)   10/11/18 0450   Skin Injury Risk (Adult)   Skin Health and Integrity making progress toward outcome       Problem: Patient Care Overview  Goal: Plan of Care Review  Outcome: Ongoing (interventions implemented as appropriate)   10/11/18 0450   Coping/Psychosocial   Plan of Care Reviewed With patient;spouse   Plan of Care Review   Progress improving   OTHER   Outcome Summary pt doing well this shift. VSS. right radial artery has no bleeding or hematoma. good pulses and cap refill. denies numbness or tingling. has been NSR on monitor with BBB and ST elevation with frequent PVCs. recieved PRN norco for c/o chronic back pain. will cont. to monitor.       Problem: Cardiac Catheterization (Diagnostic/Interventional) (Adult)  Goal: Signs and Symptoms of Listed Potential Problems Will be Absent, Minimized or Managed (Cardiac Catheterization)  Outcome: Ongoing (interventions implemented as appropriate)   10/11/18 0450   Goal/Outcome Evaluation   Problems Assessed (Cardiac Catheterization) all   Problems Present (Cardiac Cath) situational response     Goal: Anesthesia/Sedation Recovery  Outcome: Outcome(s) achieved Date Met: 10/11/18

## 2018-10-11 NOTE — PROGRESS NOTES
Clinical Nutrition Note      Patient Name: Lenny Esparza  MRN: [id number]  Admission date: 10/10/2018      Reason for Assessment:  Multidisciplinary Rounds    Additional information obtained during MDR: RN reports patient admitted with STEMI s/p JULI to LAD. Tolerating current diet. Patient and family requesting heart-healthy nutrition education prior to discharge.    Current diet: Diet Regular; Cardiac    Pertinent medical data reviewed    Intervention:  Plan of care and goals reviewed; Menu provided; Advised alternate options    Monitor:  RD to follow per protocol      Grisel Reddy  10/11/18 1:38 PM  Time: 20min

## 2018-10-11 NOTE — PROGRESS NOTES
Discharge Planning Assessment  [facility]     Patient Name: Lenny Esparza  MRN: [id number]  Today's Date: 10/11/2018    Admit Date: 10/10/2018          Discharge Needs Assessment     Row Name 10/11/18 0808       Living Environment    Lives With spouse    Name(s) of Who Lives With Patient Mikayla spouse  [phone]    Current Living Arrangements home/apartment/condo    Primary Care Provided by self    Provides Primary Care For no one    Family Caregiver if Needed spouse    Family Caregiver Names Mikayla    Quality of Family Relationships involved;helpful;supportive    Able to Return to Prior Arrangements yes    Living Arrangement Comments Pt lives with spouse in 1 level home in [facility].       Resource/Environmental Concerns    Resource/Environmental Concerns none       Transition Planning    Patient/Family Anticipates Transition to home with family    Patient/Family Anticipated Services at Transition     Transportation Anticipated family or friend will provide       Discharge Needs Assessment    Readmission Within the Last 30 Days no previous admission in last 30 days    Concerns to be Addressed no discharge needs identified    Equipment Currently Used at Home none    Anticipated Changes Related to Illness none    Equipment Needed After Discharge none            Discharge Plan     Row Name 10/11/18 0812       Plan    Plan Home with spouse    Patient/Family in Agreement with Plan yes    Plan Comments Met with patient and wife at . Pt was independent with ADL's prior to this admission. Pt has not had HH or DME. Pt sees Dr Whitney PCP.Pt has [employer] insurance for his medication.    Final Discharge Disposition Code 01 - home or self-care        Destination     No service coordination in this encounter.      Durable Medical Equipment     No service coordination in this encounter.      Dialysis/Infusion     No service coordination in this encounter.      Home Medical Care     No service  coordination in this encounter.      Social Care     No service coordination in this encounter.        Expected Discharge Date and Time     Expected Discharge Date Expected Discharge Time    Oct 14, 2018               Demographic Summary     Row Name 10/11/18 0807       General Information    Admission Type inpatient    Arrived From emergency department    Referral Source admission list    Reason for Consult discharge planning       Contact Information    Permission Granted to Share Info With             Functional Status     Row Name 10/11/18 0807       Functional Status    Usual Activity Tolerance excellent       Functional Status, IADL    Medications independent    Meal Preparation independent    Housekeeping independent    Laundry independent    Shopping independent            Psychosocial    No documentation.           Abuse/Neglect    No documentation.           Legal    No documentation.           Substance Abuse    No documentation.           Patient Forms    No documentation.         Annette Hines RN

## 2018-10-11 NOTE — PROGRESS NOTES
"INTENSIVIST NOTE     Hospital Day: 1      Mr. Lenny Esparza, 53 y.o. male is followed for:   STEMI and management of comorbid medical conditions        SUBJECTIVE     53-year-old male with a past history of lymphoma, hypertension, and dyslipidemia who presented with a weeklong history of crescendo type chest pain.  He presented to our emergency department on 10/10 with EKG consistent with STEMI and underwent a drug-eluting stent placement to the LAD.  Ejection fraction was 50%.    Interval history:    Denies chest pain or shortness of breath.  Afebrile.  Fluid balance -1.5 L.  Frequent ectopic beats but overriding rhythm is sinus    The patient's relevant past medical, surgical and social history were reviewed and updated in Epic as appropriate.       OBJECTIVE     Vital Sign Min/Max for last 24 hours  Temp  Min: 97.9 °F (36.6 °C)  Max: 98.4 °F (36.9 °C)   BP  Min: 96/49  Max: 149/80   Pulse  Min: 71  Max: 97   Resp  Min: 14  Max: 20   SpO2  Min: 92 %  Max: 97 %   No Data Recorded   Weight  Min: 122 kg (268 lb)  Max: 122 kg (268 lb)      Intake/Output Summary (Last 24 hours) at 10/11/18 1131  Last data filed at 10/11/18 0800   Gross per 24 hour   Intake            813.4 ml   Output             2375 ml   Net          -1561.6 ml      Flowsheet Rows      First Filed Value   Admission Height  180.3 cm (71\") Documented at 10/10/2018 1615   Admission Weight  122 kg (268 lb) Documented at 10/10/2018 1615         1    10/10/18  1615   Weight: 122 kg (268 lb)            Objective:  General Appearance:  In no acute distress.    Vital signs: (most recent): Blood pressure 125/79, pulse 72, temperature 97.9 °F (36.6 °C), temperature source Oral, resp. rate 20, height 180.3 cm (71\"), weight 122 kg (268 lb), SpO2 93 %.    HEENT: Normal HEENT exam.    Lungs:  Normal effort and normal respiratory rate.  Breath sounds clear to auscultation.  He is not in respiratory distress.  No rales, wheezes or rhonchi.    Heart: Normal rate.  " Regular rhythm.  S1 normal and S2 normal.  No murmur, gallop or friction rub.   Chest: Symmetric chest wall expansion.   Abdomen: Abdomen is soft and non-distended.  Bowel sounds are normal.   There is no abdominal tenderness.   There is no mass. There is no splenomegaly. There is no hepatomegaly.   Extremities: There is no deformity or dependent edema.    Neurological: Patient is alert and oriented to person, place and time.    Pupils:  Pupils are equal, round, and reactive to light.    Skin:  Warm and dry.              I reviewed the patient's new clinical results.  I reviewed the patient's new imaging results/reports including actual images and agree with reports.      Chest X-Ray:  NAD    INFUSIONS    nitroglycerin  Last Rate: Stopped (10/10/18 1731)         Results from last 7 days  Lab Units 10/11/18  0453 10/10/18  1622   WBC 10*3/mm3 8.81 12.45*   HEMOGLOBIN g/dL 14.7 16.2   HEMATOCRIT % 44.1 47.6   PLATELETS 10*3/mm3 129* 168       Results from last 7 days  Lab Units 10/11/18  0453 10/10/18  1622   SODIUM mmol/L 134 136   POTASSIUM mmol/L 3.9 3.8   CHLORIDE mmol/L 102 96*   CO2 mmol/L 25.0 28.0   BUN mg/dL 19 24*   GLUCOSE mg/dL 118* 157*   CREATININE mg/dL 1.07 1.38*   CALCIUM mg/dL 8.6* 9.3                 [facility]      I reviewed the patient's medications.    Assessment/Plan   ASSESSMENT      Active Hospital Problems    Diagnosis   • **ST elevation myocardial infarction (STEMI) (CMS/HCC)   • HTN (hypertension)   • HLD (hyperlipidemia)   • Chronic back pain         53-year-old male with STEMI status post JULI to LAD         PLAN     1. Aspirin/Brilinta   2. Beta-blockade   3. Resume home Neurontin dose   4. Echocardiogram   5. Telemetry when cardiology ready          I discussed the patient's findings and my recommendations with patient and nursing staff     Plan of care and goals reviewed with multidisciplinary team at daily rounds.    Lev Quinteros MD  Pulmonary and Critical Care  Medicine  10/11/18 11:31 AM

## 2018-10-11 NOTE — PROGRESS NOTES
[facility]    Inpatient Progress Note      Chief Complaint/Reason for service:    · STEMI         Subjective:       The patient is without left-sided chest pain or radiating pain to the shoulder or neck today.  Much improved after intervention yesterday.  Denies associated trouble breathing.  No discomfort from his right radial artery access site.  Tolerating his medications without difficulty.    Past medical, surgical, social and family history reviewed in the patient's electronic medical record.    Review of Systems:   Negative for exertional chest pain, dyspnea with exertion, lower extremity edema, palpitations     Problem List  Active Hospital Problems    Diagnosis Date Noted   • **ST elevation myocardial infarction (STEMI) (CMS/HCC) [I21.3] 10/10/2018   • HTN (hypertension) [I10] 10/10/2018   • HLD (hyperlipidemia) [E78.5] 10/10/2018   • Chronic back pain [M54.9, G89.29] 10/10/2018      Resolved Hospital Problems    Diagnosis Date Noted Date Resolved   No resolved problems to display.            Objective:      Current Facility-Administered Medications:   •  acetaminophen (TYLENOL) tablet 650 mg, 650 mg, Oral, Q4H PRN, Giovanny Andre MD  •  ALPRAZolam (XANAX) tablet 0.25 mg, 0.25 mg, Oral, TID PRN, Giovanny Andre MD, 0.25 mg at 10/11/18 0040  •  aspirin chewable tablet 324 mg, 324 mg, Oral, Once, Phoenix Hays MD  •  aspirin chewable tablet 81 mg, 81 mg, Oral, Daily, Giovanny Andre MD, 81 mg at 10/11/18 0832  •  calcium carbonate (TUMS) chewable tablet 500 mg (200 mg elemental), 2 tablet, Oral, BID PRN, Giovanny Andre MD  •  [START ON 10/12/2018] clopidogrel (PLAVIX) tablet 75 mg, 75 mg, Oral, Daily, Giovanny Andre MD  •  clopidogrel (PLAVIX) tablet, , Oral, Code / Trauma / Sedation Medication, Phoenix Hays MD, 300 mg at 10/10/18 1623  •  diphenhydrAMINE (BENADRYL) injection 25 mg, 25 mg, Intravenous, Q6H PRN, Giovanny Andre MD  •  famotidine (PEPCID) tablet 20 mg,  20 mg, Oral, Daily, Caren Marshall, APRN, 20 mg at 10/11/18 0832  •  gabapentin (NEURONTIN) capsule 400 mg, 400 mg, Oral, Q8H, Lev Quinteros MD, 400 mg at 10/11/18 1412  •  heparin (porcine) 5000 UNIT/ML injection, , Intravenous, Code / Trauma / Sedation Medication, Phoenix Hays MD, 7,320 Units at 10/10/18 1624  •  HYDROcodone-acetaminophen (NORCO) 5-325 MG per tablet 1 tablet, 1 tablet, Oral, Q4H PRN, Giovanny Andre MD, 1 tablet at 10/11/18 1149  •  [START ON 10/12/2018] lisinopril (PRINIVIL,ZESTRIL) tablet 5 mg, 5 mg, Oral, Q24H, Giovanny Andre MD  •  metoprolol tartrate (LOPRESSOR) half tablet 12.5 mg, 12.5 mg, Oral, Q12H, Giovanny Andre MD, 12.5 mg at 10/11/18 0832  •  nitroglycerin (NITROSTAT) SL tablet, , Sublingual, Code / Trauma / Sedation Medication, Phoenix Hays MD, 0.4 mg at 10/10/18 1625  •  nitroglycerin 50 mg/250 mL (0.2 mg/mL) infusion, , Intravenous, Code / Trauma / Sedation Continuous Med, Phoenix Hays MD, Stopped at 10/10/18 1731  •  ondansetron (ZOFRAN) tablet 4 mg, 4 mg, Oral, Q6H PRN **OR** ondansetron (ZOFRAN) injection 4 mg, 4 mg, Intravenous, Q6H PRN, Giovanny Andre MD  •  Pharmacy Consult - [facility], , Does not apply, Daily, Flora Pichardo, [facility]  •  rivaroxaban (XARELTO) tablet 20 mg, 20 mg, Oral, Daily With Dinner, Giovanny Andre MD  •  sodium chloride 0.9 % flush 10 mL, 10 mL, Intravenous, PRN, Phoenix Hays MD  •  ticagrelor (BRILINTA) tablet 90 mg, 90 mg, Oral, BID, Giovanny Andre MD    Vital Sign Min/Max for last 24 hours  Temp  Min: 97.9 °F (36.6 °C)  Max: 98.5 °F (36.9 °C)   BP  Min: 96/49  Max: 149/80   Pulse  Min: 71  Max: 97   Resp  Min: 14  Max: 20   SpO2  Min: 84 %  Max: 97 %   No Data Recorded      Intake/Output Summary (Last 24 hours) at 10/11/18 1555  Last data filed at 10/11/18 1200   Gross per 24 hour   Intake            813.4 ml   Output             2650 ml   Net          -1836.6 ml           CONSTITUTIONAL: No acute distress, normal  affect  RESPIRATORY: Normal effort. Clear to auscultation bilaterally without wheezing or rales  CARDIOVASCULAR: Regular rate and rhythm with normal S1 and S2. Without murmur, gallop or rub.  PERIPHERAL VASCULAR: Normal radial pulses bilaterally. There is no peripheral edema bilaterally.  Right radial artery access site clean dry and intact    Results Review:   Lab Results   Component Value Date    TROPONINI 6.303 (C) 10/10/2018       BUN   Date Value Ref Range Status   10/11/2018 19 9 - 23 mg/dL Final     Creatinine   Date Value Ref Range Status   10/11/2018 1.07 0.60 - 1.30 mg/dL Final     Potassium   Date Value Ref Range Status   10/11/2018 3.9 3.5 - 5.5 mmol/L Final     ALT (SGPT)   Date Value Ref Range Status   10/10/2018 26 7 - 40 U/L Final     AST (SGOT)   Date Value Ref Range Status   10/10/2018 29 0 - 33 U/L Final       Lab Results   Component Value Date    CHOL 146 10/11/2018     Lab Results   Component Value Date    TRIG 113 10/11/2018     Lab Results   Component Value Date    HDL 28 (L) 10/11/2018     No components found for: LDLCALC  No results found for: VLDL  No results found for: LDLHDL    Tele:  NSR with frequent PVCs    C 10/10/18  · There is a 100% stenosis of the mid LAD that is now status post intervention with a Xience Summer 3.0 x 23 mm drug-eluting stent and post dilatation with a 3.5 mm noncompliant balloon.  · There is a residual 60% tubular proximal RCA stenosis as well as a Alonso class (1,1,1) bifurcation stenosis in the distal RCA at the bifurcation of the RPL S and RPDA   · Left ventricular ejection fraction 50% with hypokinesis of the anterior and apical segments.  · Frequent PVCs noted throughout the study    TTE 10/11/18  · Left ventricular systolic function is normal. Estimated EF = 50%.  · Left ventricular wall thickness is consistent with mild concentric hypertrophy.  · The left ventricular cavity is borderline dilated.  · The following left ventricular wall segments are  hypokinetic: mid anterior, apical anterior, apical inferior, apical septal, apex hypokinetic and mid anteroseptal.  · Left ventricular diastolic dysfunction (grade I a) consistent with impaired relaxation.  · A echogenic left ventricular thrombus is present. The left ventricular thrombus is fixed and located in the apex.         Assessment/Plan:     ASSESSMENT:  -STEMI, anterior, delayed presentation, CAD status post PCI to the LAD.  Residual RCA disease  -Frequent PVCs  -Mild systolic heart failure  -Essential hypertension  -Hyperlipidemia, previous intolerance due to myalgias on 3 statins including pravastatin, simvastatin, rosuvastatin   -LV thrombus    PLAN:  -Okay to transfer to telemetry.  Close monitoring of rhythm due to frequent PVCs in the setting of  delayed anterior STEMI   -Dual antiplatelet therapy with aspirin and clopidogrel.  Changing Ticagrelor to clopidogrel to avoid being on Ticagrelor and Xarelto at the same time  -Start Xarelto for LV thrombus on echocardiogram  -Start lisinopril due to mildly depressed LV function, monitor blood pressure  -Continue metoprolol  -Beginning paper work for the patient's PCS K-9 inhibitor candidacy  -Possible discharge tomorrow if clinically stable    Giovanny Andre MD, MSc, FACC  Interventional Cardiology  [facility]  10/11/2018

## 2018-10-12 VITALS
SYSTOLIC BLOOD PRESSURE: 132 MMHG | DIASTOLIC BLOOD PRESSURE: 82 MMHG | RESPIRATION RATE: 20 BRPM | BODY MASS INDEX: 37.52 KG/M2 | WEIGHT: 268 LBS | OXYGEN SATURATION: 95 % | TEMPERATURE: 97.8 F | HEART RATE: 70 BPM | HEIGHT: 71 IN

## 2018-10-12 LAB
ANION GAP SERPL CALCULATED.3IONS-SCNC: 7 MMOL/L (ref 3–11)
BH CV ECHO MEAS - AO ROOT AREA (BSA CORRECTED): 1.4
BH CV ECHO MEAS - AO ROOT AREA: 8.7 CM^2
BH CV ECHO MEAS - AO ROOT DIAM: 3.3 CM
BH CV ECHO MEAS - BSA(HAYCOCK): 2.5 M^2
BH CV ECHO MEAS - BSA: 2.4 M^2
BH CV ECHO MEAS - BZI_BMI: 37.4 KILOGRAMS/M^2
BH CV ECHO MEAS - BZI_METRIC_HEIGHT: 180.3 CM
BH CV ECHO MEAS - BZI_METRIC_WEIGHT: 121.6 KG
BH CV ECHO MEAS - EDV(CUBED): 146.6 ML
BH CV ECHO MEAS - EDV(MOD-SP2): 138 ML
BH CV ECHO MEAS - EDV(MOD-SP4): 168 ML
BH CV ECHO MEAS - EDV(TEICH): 133.8 ML
BH CV ECHO MEAS - EF(CUBED): 65.8 %
BH CV ECHO MEAS - EF(MOD-BP): 50 %
BH CV ECHO MEAS - EF(MOD-SP2): 47.8 %
BH CV ECHO MEAS - EF(MOD-SP4): 49.4 %
BH CV ECHO MEAS - EF(TEICH): 56.9 %
BH CV ECHO MEAS - ESV(CUBED): 50.2 ML
BH CV ECHO MEAS - ESV(MOD-SP2): 72 ML
BH CV ECHO MEAS - ESV(MOD-SP4): 85 ML
BH CV ECHO MEAS - ESV(TEICH): 57.7 ML
BH CV ECHO MEAS - FS: 30.1 %
BH CV ECHO MEAS - IVS/LVPW: 1
BH CV ECHO MEAS - IVSD: 1.2 CM
BH CV ECHO MEAS - LAT PEAK E' VEL: 8.9 CM/SEC
BH CV ECHO MEAS - LATERAL E/E' RATIO: 9.4
BH CV ECHO MEAS - LV DIASTOLIC VOL/BSA (35-75): 70.3 ML/M^2
BH CV ECHO MEAS - LV MASS(C)D: 264.4 GRAMS
BH CV ECHO MEAS - LV MASS(C)DI: 110.7 GRAMS/M^2
BH CV ECHO MEAS - LV SYSTOLIC VOL/BSA (12-30): 35.6 ML/M^2
BH CV ECHO MEAS - LVIDD: 5.3 CM
BH CV ECHO MEAS - LVIDS: 3.7 CM
BH CV ECHO MEAS - LVLD AP2: 9 CM
BH CV ECHO MEAS - LVLD AP4: 9.3 CM
BH CV ECHO MEAS - LVLS AP2: 9.2 CM
BH CV ECHO MEAS - LVLS AP4: 9.4 CM
BH CV ECHO MEAS - LVPWD: 1.2 CM
BH CV ECHO MEAS - MED PEAK E' VEL: 9.5 CM/SEC
BH CV ECHO MEAS - MEDIAL E/E' RATIO: 8.7
BH CV ECHO MEAS - MV A MAX VEL: 93.8 CM/SEC
BH CV ECHO MEAS - MV E MAX VEL: 84.9 CM/SEC
BH CV ECHO MEAS - MV E/A: 0.91
BH CV ECHO MEAS - PA ACC SLOPE: 547.1 CM/SEC^2
BH CV ECHO MEAS - PA ACC TIME: 0.11 SEC
BH CV ECHO MEAS - PA PR(ACCEL): 30.3 MMHG
BH CV ECHO MEAS - SI(CUBED): 40.4 ML/M^2
BH CV ECHO MEAS - SI(MOD-SP2): 27.6 ML/M^2
BH CV ECHO MEAS - SI(MOD-SP4): 34.8 ML/M^2
BH CV ECHO MEAS - SI(TEICH): 31.9 ML/M^2
BH CV ECHO MEAS - SV(CUBED): 96.5 ML
BH CV ECHO MEAS - SV(MOD-SP2): 66 ML
BH CV ECHO MEAS - SV(MOD-SP4): 83 ML
BH CV ECHO MEAS - SV(TEICH): 76.1 ML
BH CV ECHO MEAS - TAPSE (>1.6): 2.7 CM2
BH CV ECHO MEASUREMENTS AVERAGE E/E' RATIO: 9.23
BH CV VAS BP LEFT ARM: NORMAL MMHG
BH CV XLRA - RV BASE: 4.4 CM
BH CV XLRA - RV LENGTH: 9.1 CM
BH CV XLRA - RV MID: 3.2 CM
BH CV XLRA - TDI S': 12.6 CM/SEC
BUN BLD-MCNC: 17 MG/DL (ref 9–23)
BUN/CREAT SERPL: 16.5 (ref 7–25)
CALCIUM SPEC-SCNC: 9 MG/DL (ref 8.7–10.4)
CHLORIDE SERPL-SCNC: 100 MMOL/L (ref 99–109)
CO2 SERPL-SCNC: 30 MMOL/L (ref 20–31)
CREAT BLD-MCNC: 1.03 MG/DL (ref 0.6–1.3)
DEPRECATED RDW RBC AUTO: 40.2 FL (ref 37–54)
ERYTHROCYTE [DISTWIDTH] IN BLOOD BY AUTOMATED COUNT: 12.5 % (ref 11.3–14.5)
GFR SERPL CREATININE-BSD FRML MDRD: 76 ML/MIN/1.73
GLUCOSE BLD-MCNC: 182 MG/DL (ref 70–100)
HCT VFR BLD AUTO: 45.3 % (ref 38.9–50.9)
HGB BLD-MCNC: 15.1 G/DL (ref 13.1–17.5)
LV EF 2D ECHO EST: 50 %
MAXIMAL PREDICTED HEART RATE: 169 BPM
MCH RBC QN AUTO: 29.4 PG (ref 27–31)
MCHC RBC AUTO-ENTMCNC: 33.3 G/DL (ref 32–36)
MCV RBC AUTO: 88.1 FL (ref 80–99)
PLATELET # BLD AUTO: 152 10*3/MM3 (ref 150–450)
PMV BLD AUTO: 12 FL (ref 6–12)
POTASSIUM BLD-SCNC: 4.5 MMOL/L (ref 3.5–5.5)
RBC # BLD AUTO: 5.14 10*6/MM3 (ref 4.2–5.76)
SODIUM BLD-SCNC: 137 MMOL/L (ref 132–146)
STRESS TARGET HR: 144 BPM
WBC NRBC COR # BLD: 7.28 10*3/MM3 (ref 3.5–10.8)

## 2018-10-12 PROCEDURE — 80048 BASIC METABOLIC PNL TOTAL CA: CPT | Performed by: INTERNAL MEDICINE

## 2018-10-12 PROCEDURE — 99239 HOSP IP/OBS DSCHRG MGMT >30: CPT | Performed by: INTERNAL MEDICINE

## 2018-10-12 PROCEDURE — 85027 COMPLETE CBC AUTOMATED: CPT | Performed by: INTERNAL MEDICINE

## 2018-10-12 RX ORDER — NITROGLYCERIN 0.4 MG/1
TABLET SUBLINGUAL
Qty: 25 TABLET | Refills: 0 | Status: SHIPPED | OUTPATIENT
Start: 2018-10-12 | End: 2018-12-12 | Stop reason: SDUPTHER

## 2018-10-12 RX ORDER — CLOPIDOGREL BISULFATE 75 MG/1
75 TABLET ORAL DAILY
Qty: 30 TABLET | Refills: 11 | Status: SHIPPED | OUTPATIENT
Start: 2018-10-13 | End: 2022-08-29

## 2018-10-12 RX ORDER — ASPIRIN 81 MG/1
81 TABLET, CHEWABLE ORAL DAILY
Qty: 30 TABLET | Refills: 11 | Status: SHIPPED | OUTPATIENT
Start: 2018-10-13

## 2018-10-12 RX ORDER — LISINOPRIL 5 MG/1
5 TABLET ORAL
Qty: 30 TABLET | Refills: 11 | Status: SHIPPED | OUTPATIENT
Start: 2018-10-13 | End: 2018-11-08 | Stop reason: HOSPADM

## 2018-10-12 RX ADMIN — LISINOPRIL 5 MG: 5 TABLET ORAL at 08:00

## 2018-10-12 RX ADMIN — ASPIRIN 81 MG 81 MG: 81 TABLET ORAL at 08:00

## 2018-10-12 RX ADMIN — FAMOTIDINE 20 MG: 20 TABLET ORAL at 08:00

## 2018-10-12 RX ADMIN — GABAPENTIN 400 MG: 400 CAPSULE ORAL at 06:13

## 2018-10-12 RX ADMIN — HYDROCODONE BITARTRATE AND ACETAMINOPHEN 1 TABLET: 5; 325 TABLET ORAL at 06:13

## 2018-10-12 RX ADMIN — CLOPIDOGREL BISULFATE 75 MG: 75 TABLET ORAL at 08:00

## 2018-10-12 RX ADMIN — METOPROLOL TARTRATE 12.5 MG: 25 TABLET, FILM COATED ORAL at 08:00

## 2018-10-12 NOTE — PLAN OF CARE
Problem: Patient Care Overview  Goal: Plan of Care Review  Outcome: Ongoing (interventions implemented as appropriate)   10/12/18 3014   Coping/Psychosocial   Plan of Care Reviewed With patient   OTHER   Outcome Summary Pt arrived from [facility] via em[facility], NIHSS at time of arrival was a 1 due to sensation, Cardene started to keep sbp <140, stroke education provided, will cont to lottie       Problem: Cardiac Catheterization (Diagnostic/Interventional) (Adult)  Goal: Signs and Symptoms of Listed Potential Problems Will be Absent, Minimized or Managed (Cardiac Catheterization)  Outcome: Ongoing (interventions implemented as appropriate)

## 2018-10-12 NOTE — DISCHARGE SUMMARY
[facility]  DISCHARGE SUMMARY    Date of Discharge:  10/12/2018    Discharge Diagnosis: STEMI    Presenting Problem/History of Present Illness  ST elevation myocardial infarction (STEMI), unspecified artery (CMS/[facility]) [I21.3]      Hospital Course  Patient is a 53 y.o. male presented with STEMI on ECG at an outside facility on 10/10/18 and then drove to [facility] for further intervention.  He had been having chest pressure with exertion intermittently for 6 days prior to arrival, with associated shortness of breath and diaphoresis.  He underwent emergent cardiac catheterization and was noted to have a 100% stenosis of the mid LAD, 60% tubular proximal RCA stenosis as well as Alonso class (1,1,1) bifurcation stenosis in the distal RCA at the bifurcation of the RPL S and RPDA.  He was admitted to ICU and has had an uneventful recovery.  He was noted to have an echogenic left ventricular thrombus on echocardiogram and was started on Xarelto.  He will return on apporximately Novemeber 13 for a staged intervention of the bifurcation of the RPL S and RPDA as well as further functional assessment of the proximal to mid RCA.    Procedures Performed  Procedure(s):  Left Heart Cath       Consults:   Consults     No orders found from 9/11/2018 to 10/11/2018.          Pertinent Test Results:     TTE 10/11/2018  · Left ventricular systolic function is normal. Estimated EF = 50%.  · Left ventricular wall thickness is consistent with mild concentric hypertrophy.  · The left ventricular cavity is borderline dilated.  · The following left ventricular wall segments are hypokinetic: mid anterior, apical anterior, apical inferior, apical septal, apex hypokinetic and mid anteroseptal.  · Left ventricular diastolic dysfunction (grade I a) consistent with impaired relaxation.  · A echogenic left ventricular thrombus is present. The left ventricular thrombus is fixed and located in the apex.    [facility]  10/10/2018  ·  There is a 100% stenosis of the mid LAD that is now status post intervention with a Xience Summer 3.0 x 23 mm drug-eluting stent and post dilatation with a 3.5 mm noncompliant balloon.  · There is a residual 60% tubular proximal RCA stenosis as well as a Alonso class (1,1,1) bifurcation stenosis in the distal RCA at the bifurcation of the RPL S and RPDA   · Left ventricular ejection fraction 50% with hypokinesis of the anterior and apical segments.  · Frequent PVCs noted throughout the study    Condition on Discharge:  Stable    Physical Exam at Discharge  Vital Signs  Temp:  [97.8 °F (36.6 °C)-99.7 °F (37.6 °C)] 97.8 °F (36.6 °C)  Heart Rate:  [64-84] 70  Resp:  [16-20] 20  BP: (111-142)/(62-93) 132/82  Physical Exam:  CONSTITUTIONAL: No acute distress, normal affect.  RESPIRATORY: Normal effort. Clear to auscultation bilaterally without wheezing or rales.  CARDIOVASCULAR: Regular rate and rhythm with normal S1 and S2. Without murmur, gallop or rub.  PERIPHERAL VASCULAR: Normal radial pulses bilaterally. There is no peripheral edema bilaterally.  Right radial artery access site clean dry and intact.    Discharge Disposition: Home    Discharge Diet: Cardiac heart healthy diet    Activity at Discharge: As tolerated    Follow-up Appointments  No future appointments.  Will schedule follow-up with Dr. Andre in the cardiology clinic after the patient returns for staged intervention.  Additional Instructions for the Follow-ups that You Need to Schedule     Ambulatory Referral to Cardiac Rehab    As directed            Discharge Medications     Discharge Medications      New Medications      Instructions Start Date   aspirin 81 MG chewable tablet   81 mg, Oral, Daily      clopidogrel 75 MG tablet  Commonly known as:  PLAVIX   75 mg, Oral, Daily      lisinopril 5 MG tablet  Commonly known as:  PRINIVIL,ZESTRIL   5 mg, Oral, Every 24 Hours Scheduled      metoprolol tartrate 25 MG tablet  Commonly known as:   LOPRESSOR   12.5 mg, Oral, Every 12 Hours Scheduled      rivaroxaban 20 MG tablet  Commonly known as:  XARELTO   20 mg, Oral, Daily With Dinner         Continue These Medications      Instructions Start Date   allopurinol 100 MG tablet  Commonly known as:  ZYLOPRIM   100 mg, Oral, Daily PRN      colchicine 0.6 MG tablet   0.6 mg, Oral, Daily PRN      gabapentin 600 MG tablet  Commonly known as:  NEURONTIN   600 mg, Oral, 3 Times Daily      HYDROcodone-acetaminophen  MG per tablet  Commonly known as:  NORCO   1 tablet, Oral, 3 Times Daily      temazepam 15 MG capsule  Commonly known as:  RESTORIL   15 mg, Oral, Nightly PRN           LINNETTE Gonsalves obtained past medical, family history, social history, review of systems and functioned as a scribe for the remainder of the dictation for Dr. Andre.      10/12/18  11:42 AM    Time: Discharge 35 min     I, Giovanny Andre MD, personally performed the services as scribed by the above named individual. I have made any necessary edits and it is both accurate and complete.     Giovanny Andre MD, MSc, FAC  Interventional Cardiology  [facility]

## 2018-10-13 ENCOUNTER — READMISSION MANAGEMENT (OUTPATIENT)
Dept: CALL CENTER | Facility: HOSPITAL | Age: 53
End: 2018-10-13

## 2018-10-13 NOTE — OUTREACH NOTE
Prep Survey      Responses   Facility patient discharged from?  [facility]   Is patient eligible?  Yes   Discharge diagnosis  STEMI,  heart cath   Does the patient have one of the following disease processes/diagnoses(primary or secondary)?  Acute MI (STEMI,NSTEMI)   Does the patient have Home health ordered?  No   Is there a DME ordered?  No   Comments regarding appointments  call for apmt   Prep survey completed?  Yes          Alia Torre RN

## 2018-10-15 ENCOUNTER — DOCUMENTATION (OUTPATIENT)
Dept: CARDIAC REHAB | Facility: HOSPITAL | Age: 53
End: 2018-10-15

## 2018-10-17 ENCOUNTER — READMISSION MANAGEMENT (OUTPATIENT)
Dept: CALL CENTER | Facility: HOSPITAL | Age: 53
End: 2018-10-17

## 2018-10-17 NOTE — OUTREACH NOTE
AMI Week 1 Survey      Responses   Facility patient discharged from?  [facility]   Does the patient have one of the following disease processes/diagnoses(primary or secondary)?  Acute MI (STEMI,NSTEMI)   Is there a successful TCM telephone encounter documented?  No   Week 1 attempt successful?  Yes   Call start time  1338   Call end time  1355   Discharge diagnosis  STEMI,  heart cath   Is patient permission given to speak with other caregiver?  Yes   List who call center can speak with  Mikayla, spouse   Person spoke with today (if not patient) and relationship  Mikayla, spouse   Meds reviewed with patient/caregiver?  Yes   Is the patient having any side effects they believe may be caused by any medication additions or changes?  No   Does the patient have all prescriptions related to this admission filled (includes statins,anticoagulants,HTN meds,anti-arrhythmia meds)  Yes   Is the patient taking all medications as directed (includes completed medication regime)?  Yes   Does the patient have a primary care provider?   Yes   Does the patient have an appointment with their PCP,cardiologist,or clinic within 7 days of discharge?  Yes   Comments regarding PCP  PCP Dr Nair   Has the patient kept scheduled appointments due by today?  Yes   Comments  Wife states awaiting to be called for next procedure from Dr Andre's office.    Has home health visited the patient within 72 hours of discharge?  N/A   Psychosocial issues?  No   Did the patient receive a copy of their discharge instructions?  Yes   Nursing interventions  Reviewed instructions with patient   What is the patient's perception of their health status since discharge?  Improving   Nursing interventions  Nurse provided patient education   Is the patient/caregiver able to teach back signs and symptoms of when to call for help immediately:  Sudden chest discomfort, Sudden discomfort in arms, back, neck or jaw, Shortness of breath at any time, Sudden sweating or clammy  skin, Nausea or vomiting, Dizziness or lightheadedness, Irregular or rapid heart rate   Nursing interventions  Nurse provided patient education   Is the pateint /caregiver able to teach back the importance of cardiac rehab?  Yes   Nursing interventions  Provided education on importance of cardiac rehab [Wife states that patient will be starting cardiac rehab in [place]. ]   Is the patient/caregiver able to teach back lifestyle changes to help prevent MIs  Regular exercise as approved by provider, Heart healthy diet, Maintaining a healthy weight, Reducing stress   Is the patient/caregiver able to teach back ways to prevent a second heart attack:  Take medications, Follow up with MD, Participate in Cardiac Rehab, Manage risk factors   Is the patient/caregiver able to teach back the hierarchy of who to call/visit for symptoms/problems? PCP, Specialist, Home health nurse, Urgent Care, ED, 911  Yes   Week 1 call completed?  Yes          Candace Garcia RN

## 2018-10-23 ENCOUNTER — DOCUMENTATION (OUTPATIENT)
Dept: CARDIAC REHAB | Facility: HOSPITAL | Age: 53
End: 2018-10-23

## 2018-10-23 NOTE — PROGRESS NOTES
Pt. Referred for Phase II Cardiac Rehab. Staff discussed benefits of exercise, program protocol, and educational material provided. Teach back verified.  Patient has spoken with [facility] regarding program information and scheduling.  He will be having a staged PCI in the near future and will schedule after that procedure.

## 2018-10-24 ENCOUNTER — READMISSION MANAGEMENT (OUTPATIENT)
Dept: CALL CENTER | Facility: HOSPITAL | Age: 53
End: 2018-10-24

## 2018-10-24 NOTE — OUTREACH NOTE
AMI Week 2 Survey      Responses   Facility patient discharged from?  [place]   Does the patient have one of the following disease processes/diagnoses(primary or secondary)?  Acute MI (STEMI,NSTEMI)   Week 2 attempt successful?  Yes   Call start time  1551   Call end time  1557   Discharge diagnosis  STEMI,  heart cath   Meds reviewed with patient/caregiver?  Yes   Is the patient having any side effects they believe may be caused by any medication additions or changes?  No   Is the patient taking all medications as directed (includes completed medication regime)?  Yes   Has the patient kept scheduled appointments due by today?  Yes   Psychosocial issues?  No   Comments  /70 per pt,  LHC right radial no issues.    What is the patient's perception of their health status since discharge?  Improving   Nursing interventions  Nurse provided patient education   Is the patient/caregiver able to teach back signs and symptoms of when to call for help immediately:  Sudden chest discomfort, Dizziness or lightheadedness, Sudden discomfort in arms, back, neck or jaw, Shortness of breath at any time   Nursing interventions  Nurse provided patient education   Is the patient/caregiver able to teach back lifestyle changes to help prevent MIs  Regular exercise as approved by provider, Maintaining a healthy weight, Reducing stress   Is the patient/caregiver able to teach back ways to prevent a second heart attack:  Take medications, Follow up with MD   Is the patient/caregiver able to teach back the hierarchy of who to call/visit for symptoms/problems? PCP, Specialist, Home health nurse, Urgent Care, ED, 911  Yes   Week 2 call completed?  Yes          Liya Atkins RN

## 2018-11-01 ENCOUNTER — READMISSION MANAGEMENT (OUTPATIENT)
Dept: CALL CENTER | Facility: HOSPITAL | Age: 53
End: 2018-11-01

## 2018-11-01 ENCOUNTER — PREP FOR SURGERY (OUTPATIENT)
Dept: OTHER | Facility: HOSPITAL | Age: 53
End: 2018-11-01

## 2018-11-01 DIAGNOSIS — I25.10 CAD (CORONARY ARTERY DISEASE): Primary | ICD-10-CM

## 2018-11-01 DIAGNOSIS — E11.9 DIABETES MELLITUS (HCC): ICD-10-CM

## 2018-11-01 RX ORDER — SODIUM CHLORIDE 0.9 % (FLUSH) 0.9 %
3 SYRINGE (ML) INJECTION EVERY 12 HOURS SCHEDULED
Status: CANCELLED | OUTPATIENT
Start: 2018-11-01

## 2018-11-01 RX ORDER — SODIUM CHLORIDE 0.9 % (FLUSH) 0.9 %
3-10 SYRINGE (ML) INJECTION AS NEEDED
Status: CANCELLED | OUTPATIENT
Start: 2018-11-01

## 2018-11-01 RX ORDER — ASPIRIN 81 MG/1
324 TABLET, CHEWABLE ORAL ONCE
Status: CANCELLED | OUTPATIENT
Start: 2018-11-01 | End: 2018-11-01

## 2018-11-01 RX ORDER — ASPIRIN 81 MG/1
81 TABLET ORAL DAILY
Status: CANCELLED | OUTPATIENT
Start: 2018-11-02

## 2018-11-01 RX ORDER — ACETAMINOPHEN 325 MG/1
650 TABLET ORAL EVERY 4 HOURS PRN
Status: CANCELLED | OUTPATIENT
Start: 2018-11-01

## 2018-11-01 RX ORDER — ONDANSETRON 2 MG/ML
4 INJECTION INTRAMUSCULAR; INTRAVENOUS EVERY 6 HOURS PRN
Status: CANCELLED | OUTPATIENT
Start: 2018-11-01

## 2018-11-01 NOTE — OUTREACH NOTE
AMI Week 3 Survey      Responses   Facility patient discharged from?  [place]   Does the patient have one of the following disease processes/diagnoses(primary or secondary)?  Acute MI (STEMI,NSTEMI)   Week 3 attempt successful?  Yes   Call start time  1027   Call end time  1045   Discharge diagnosis  STEMI,  heart cath   Meds reviewed with patient/caregiver?  Yes   Is the patient taking all medications as directed (includes completed medication regime)?  Yes   Comments regarding appointments  Pt to come back to PAT on 11/7 then cath on 11/8. He asked about holding meds and i called [facility] and was given number for Dr Andre's office which I gave to pt. He should also receive a letter in mail about AC   Does the patient have a primary care provider?   Yes   Has the patient kept scheduled appointments due by today?  Yes   Has home health visited the patient within 72 hours of discharge?  N/A   Psychosocial issues?  No   Comments  Wrist site intact. Second cath on 11/8 scheduled for X2 blockages   What is the patient's perception of their health status since discharge?  Improving   Nursing interventions  Nurse provided patient education   Is the patient/caregiver able to teach back signs and symptoms of when to call for help immediately:  Sudden chest discomfort, Dizziness or lightheadedness, Sudden discomfort in arms, back, neck or jaw, Shortness of breath at any time, Sudden sweating or clammy skin, Nausea or vomiting   Nursing interventions  Nurse provided patient education   Is the pateint /caregiver able to teach back the importance of cardiac rehab?  Yes   Is the patient/caregiver able to teach back lifestyle changes to help prevent MIs  Regular exercise as approved by provider, Maintaining a healthy weight, Reducing stress   Is the patient/caregiver able to teach back ways to prevent a second heart attack:  Take medications, Follow up with MD   Is the patient/caregiver able to teach back the hierarchy of who to  call/visit for symptoms/problems? PCP, Specialist, Home health nurse, Urgent Care, ED, 911  Yes   Week 3 call completed?  Yes          Melani Thomas RN

## 2018-11-07 ENCOUNTER — APPOINTMENT (OUTPATIENT)
Dept: PREADMISSION TESTING | Facility: HOSPITAL | Age: 53
End: 2018-11-07

## 2018-11-07 DIAGNOSIS — I25.10 CAD (CORONARY ARTERY DISEASE): ICD-10-CM

## 2018-11-07 DIAGNOSIS — E11.9 DIABETES MELLITUS (HCC): ICD-10-CM

## 2018-11-07 LAB
ALBUMIN SERPL-MCNC: 4.6 G/DL (ref 3.2–4.8)
ALBUMIN/GLOB SERPL: 2.9 G/DL (ref 1.5–2.5)
ALP SERPL-CCNC: 57 U/L (ref 25–100)
ALT SERPL W P-5'-P-CCNC: 27 U/L (ref 7–40)
ANION GAP SERPL CALCULATED.3IONS-SCNC: 5 MMOL/L (ref 3–11)
AST SERPL-CCNC: 23 U/L (ref 0–33)
BILIRUB SERPL-MCNC: 0.8 MG/DL (ref 0.3–1.2)
BUN BLD-MCNC: 19 MG/DL (ref 9–23)
BUN/CREAT SERPL: 20.4 (ref 7–25)
CALCIUM SPEC-SCNC: 9.3 MG/DL (ref 8.7–10.4)
CHLORIDE SERPL-SCNC: 105 MMOL/L (ref 99–109)
CO2 SERPL-SCNC: 26 MMOL/L (ref 20–31)
CREAT BLD-MCNC: 0.93 MG/DL (ref 0.6–1.3)
DEPRECATED RDW RBC AUTO: 39.5 FL (ref 37–54)
ERYTHROCYTE [DISTWIDTH] IN BLOOD BY AUTOMATED COUNT: 12.7 % (ref 11.3–14.5)
GFR SERPL CREATININE-BSD FRML MDRD: 86 ML/MIN/1.73
GLOBULIN UR ELPH-MCNC: 1.6 GM/DL
GLUCOSE BLD-MCNC: 105 MG/DL (ref 70–100)
HCT VFR BLD AUTO: 49.2 % (ref 38.9–50.9)
HGB BLD-MCNC: 16.9 G/DL (ref 13.1–17.5)
INR PPP: 1.07 (ref 0.91–1.09)
MCH RBC QN AUTO: 29.4 PG (ref 27–31)
MCHC RBC AUTO-ENTMCNC: 34.3 G/DL (ref 32–36)
MCV RBC AUTO: 85.7 FL (ref 80–99)
PLATELET # BLD AUTO: 126 10*3/MM3 (ref 150–450)
PMV BLD AUTO: 12.7 FL (ref 6–12)
POTASSIUM BLD-SCNC: 4.6 MMOL/L (ref 3.5–5.5)
PROT SERPL-MCNC: 6.2 G/DL (ref 5.7–8.2)
PROTHROMBIN TIME: 11.2 SECONDS (ref 9.6–11.5)
RBC # BLD AUTO: 5.74 10*6/MM3 (ref 4.2–5.76)
SODIUM BLD-SCNC: 136 MMOL/L (ref 132–146)
WBC NRBC COR # BLD: 6.88 10*3/MM3 (ref 3.5–10.8)

## 2018-11-07 PROCEDURE — 85610 PROTHROMBIN TIME: CPT | Performed by: NURSE PRACTITIONER

## 2018-11-07 PROCEDURE — 80053 COMPREHEN METABOLIC PANEL: CPT | Performed by: NURSE PRACTITIONER

## 2018-11-07 PROCEDURE — 85027 COMPLETE CBC AUTOMATED: CPT | Performed by: NURSE PRACTITIONER

## 2018-11-07 PROCEDURE — 36415 COLL VENOUS BLD VENIPUNCTURE: CPT

## 2018-11-07 RX ORDER — ALPRAZOLAM 0.5 MG/1
0.5 TABLET ORAL NIGHTLY PRN
COMMUNITY

## 2018-11-07 RX ORDER — CHLORAL HYDRATE 500 MG
1000 CAPSULE ORAL
COMMUNITY
End: 2019-05-01

## 2018-11-07 NOTE — DISCHARGE INSTRUCTIONS

## 2018-11-08 ENCOUNTER — TELEPHONE (OUTPATIENT)
Dept: CARDIOLOGY | Facility: CLINIC | Age: 53
End: 2018-11-08

## 2018-11-08 ENCOUNTER — READMISSION MANAGEMENT (OUTPATIENT)
Dept: CALL CENTER | Facility: HOSPITAL | Age: 53
End: 2018-11-08

## 2018-11-08 ENCOUNTER — HOSPITAL ENCOUNTER (OUTPATIENT)
Facility: HOSPITAL | Age: 53
Discharge: HOME | End: 2018-11-08
Attending: INTERNAL MEDICINE | Admitting: INTERNAL MEDICINE

## 2018-11-08 VITALS
BODY MASS INDEX: 34.6 KG/M2 | RESPIRATION RATE: 18 BRPM | OXYGEN SATURATION: 99 % | TEMPERATURE: 97.7 F | SYSTOLIC BLOOD PRESSURE: 105 MMHG | HEIGHT: 71 IN | DIASTOLIC BLOOD PRESSURE: 61 MMHG | WEIGHT: 247.14 LBS | HEART RATE: 55 BPM

## 2018-11-08 DIAGNOSIS — I21.3 ST ELEVATION MYOCARDIAL INFARCTION (STEMI), UNSPECIFIED ARTERY (HCC): ICD-10-CM

## 2018-11-08 DIAGNOSIS — I25.10 CAD (CORONARY ARTERY DISEASE): ICD-10-CM

## 2018-11-08 DIAGNOSIS — I21.02 ST ELEVATION MYOCARDIAL INFARCTION INVOLVING LEFT ANTERIOR DESCENDING (LAD) CORONARY ARTERY (HCC): Primary | ICD-10-CM

## 2018-11-08 LAB
ACT BLD: 213 SECONDS (ref 82–152)
ACT BLD: 324 SECONDS (ref 82–152)
ACT BLD: 423 SECONDS (ref 82–152)
ARTICHOKE IGE QN: 121 MG/DL (ref 0–130)
CHOLEST SERPL-MCNC: 172 MG/DL (ref 0–200)
HDLC SERPL-MCNC: 34 MG/DL (ref 40–60)
TRIGL SERPL-MCNC: 121 MG/DL (ref 0–150)

## 2018-11-08 PROCEDURE — 92978 ENDOLUMINL IVUS OCT C 1ST: CPT

## 2018-11-08 PROCEDURE — A9270 NON-COVERED ITEM OR SERVICE: HCPCS | Performed by: NURSE PRACTITIONER

## 2018-11-08 PROCEDURE — C9600 PERC DRUG-EL COR STENT SING: HCPCS | Performed by: INTERNAL MEDICINE

## 2018-11-08 PROCEDURE — C1725 CATH, TRANSLUMIN NON-LASER: HCPCS | Performed by: INTERNAL MEDICINE

## 2018-11-08 PROCEDURE — 25010000002 FENTANYL CITRATE (PF) 100 MCG/2ML SOLUTION: Performed by: INTERNAL MEDICINE

## 2018-11-08 PROCEDURE — 93571 IV DOP VEL&/PRESS C FLO 1ST: CPT | Performed by: INTERNAL MEDICINE

## 2018-11-08 PROCEDURE — C1769 GUIDE WIRE: HCPCS | Performed by: INTERNAL MEDICINE

## 2018-11-08 PROCEDURE — C1887 CATHETER, GUIDING: HCPCS | Performed by: INTERNAL MEDICINE

## 2018-11-08 PROCEDURE — C1874 STENT, COATED/COV W/DEL SYS: HCPCS | Performed by: INTERNAL MEDICINE

## 2018-11-08 PROCEDURE — 25010000002 MIDAZOLAM PER 1 MG: Performed by: INTERNAL MEDICINE

## 2018-11-08 PROCEDURE — 92928 PRQ TCAT PLMT NTRAC ST 1 LES: CPT | Performed by: INTERNAL MEDICINE

## 2018-11-08 PROCEDURE — 63710000001 ASPIRIN 81 MG CHEWABLE TABLET: Performed by: NURSE PRACTITIONER

## 2018-11-08 PROCEDURE — 25010000002 HEPARIN (PORCINE) PER 1000 UNITS: Performed by: INTERNAL MEDICINE

## 2018-11-08 PROCEDURE — C1753 CATH, INTRAVAS ULTRASOUND: HCPCS | Performed by: INTERNAL MEDICINE

## 2018-11-08 PROCEDURE — 92929: CPT | Performed by: INTERNAL MEDICINE

## 2018-11-08 PROCEDURE — 0 IOPAMIDOL PER 1 ML: Performed by: INTERNAL MEDICINE

## 2018-11-08 PROCEDURE — C9601 PERC DRUG-EL COR STENT BRAN: HCPCS | Performed by: INTERNAL MEDICINE

## 2018-11-08 PROCEDURE — 92978 ENDOLUMINL IVUS OCT C 1ST: CPT | Performed by: INTERNAL MEDICINE

## 2018-11-08 PROCEDURE — 92929 PR PRQ TRLUML CORONARY STENT W/ANGIO ADDL ART/BRNCH: CPT | Performed by: INTERNAL MEDICINE

## 2018-11-08 PROCEDURE — 85347 COAGULATION TIME ACTIVATED: CPT

## 2018-11-08 PROCEDURE — 80061 LIPID PANEL: CPT | Performed by: NURSE PRACTITIONER

## 2018-11-08 PROCEDURE — C1894 INTRO/SHEATH, NON-LASER: HCPCS | Performed by: INTERNAL MEDICINE

## 2018-11-08 PROCEDURE — 36415 COLL VENOUS BLD VENIPUNCTURE: CPT

## 2018-11-08 DEVICE — IMPLANTABLE DEVICE: Type: IMPLANTABLE DEVICE | Status: FUNCTIONAL

## 2018-11-08 DEVICE — XIENCE SIERRA™ EVEROLIMUS ELUTING CORONARY STENT SYSTEM 3.00 MM X 23 MM / RAPID-EXCHANGE
Type: IMPLANTABLE DEVICE | Status: FUNCTIONAL
Brand: XIENCE SIERRA™

## 2018-11-08 DEVICE — XIENCE SIERRA™ EVEROLIMUS ELUTING CORONARY STENT SYSTEM 3.50 MM X 38 MM / RAPID-EXCHANGE
Type: IMPLANTABLE DEVICE | Status: FUNCTIONAL
Brand: XIENCE SIERRA™

## 2018-11-08 RX ORDER — SODIUM CHLORIDE 9 MG/ML
1 INJECTION, SOLUTION INTRAVENOUS CONTINUOUS
Status: DISCONTINUED | OUTPATIENT
Start: 2018-11-08 | End: 2018-11-08 | Stop reason: HOSPADM

## 2018-11-08 RX ORDER — SODIUM CHLORIDE 9 MG/ML
1-3 INJECTION, SOLUTION INTRAVENOUS CONTINUOUS
Status: DISCONTINUED | OUTPATIENT
Start: 2018-11-08 | End: 2018-11-08 | Stop reason: HOSPADM

## 2018-11-08 RX ORDER — FENTANYL CITRATE 50 UG/ML
INJECTION, SOLUTION INTRAMUSCULAR; INTRAVENOUS AS NEEDED
Status: DISCONTINUED | OUTPATIENT
Start: 2018-11-08 | End: 2018-11-08 | Stop reason: HOSPADM

## 2018-11-08 RX ORDER — ASPIRIN 81 MG/1
81 TABLET ORAL DAILY
Status: DISCONTINUED | OUTPATIENT
Start: 2018-11-09 | End: 2018-11-08 | Stop reason: HOSPADM

## 2018-11-08 RX ORDER — ASPIRIN 81 MG/1
324 TABLET, CHEWABLE ORAL ONCE
Status: COMPLETED | OUTPATIENT
Start: 2018-11-08 | End: 2018-11-08

## 2018-11-08 RX ORDER — SODIUM CHLORIDE 0.9 % (FLUSH) 0.9 %
3-10 SYRINGE (ML) INJECTION AS NEEDED
Status: DISCONTINUED | OUTPATIENT
Start: 2018-11-08 | End: 2018-11-08 | Stop reason: HOSPADM

## 2018-11-08 RX ORDER — TEMAZEPAM 15 MG/1
15 CAPSULE ORAL NIGHTLY PRN
COMMUNITY
End: 2018-12-10

## 2018-11-08 RX ORDER — ACETAMINOPHEN 325 MG/1
650 TABLET ORAL EVERY 4 HOURS PRN
Status: DISCONTINUED | OUTPATIENT
Start: 2018-11-08 | End: 2018-11-08 | Stop reason: HOSPADM

## 2018-11-08 RX ORDER — LIDOCAINE HYDROCHLORIDE 10 MG/ML
INJECTION, SOLUTION EPIDURAL; INFILTRATION; INTRACAUDAL; PERINEURAL AS NEEDED
Status: DISCONTINUED | OUTPATIENT
Start: 2018-11-08 | End: 2018-11-08 | Stop reason: HOSPADM

## 2018-11-08 RX ORDER — HYDROCODONE BITARTRATE AND ACETAMINOPHEN 5; 325 MG/1; MG/1
1 TABLET ORAL EVERY 4 HOURS PRN
Status: DISCONTINUED | OUTPATIENT
Start: 2018-11-08 | End: 2018-11-08 | Stop reason: HOSPADM

## 2018-11-08 RX ORDER — SODIUM CHLORIDE 0.9 % (FLUSH) 0.9 %
3 SYRINGE (ML) INJECTION EVERY 12 HOURS SCHEDULED
Status: DISCONTINUED | OUTPATIENT
Start: 2018-11-08 | End: 2018-11-08 | Stop reason: HOSPADM

## 2018-11-08 RX ORDER — ONDANSETRON 2 MG/ML
4 INJECTION INTRAMUSCULAR; INTRAVENOUS EVERY 6 HOURS PRN
Status: DISCONTINUED | OUTPATIENT
Start: 2018-11-08 | End: 2018-11-08 | Stop reason: HOSPADM

## 2018-11-08 RX ORDER — HEPARIN SODIUM 1000 [USP'U]/ML
INJECTION, SOLUTION INTRAVENOUS; SUBCUTANEOUS AS NEEDED
Status: DISCONTINUED | OUTPATIENT
Start: 2018-11-08 | End: 2018-11-08 | Stop reason: HOSPADM

## 2018-11-08 RX ORDER — MIDAZOLAM HYDROCHLORIDE 1 MG/ML
INJECTION INTRAMUSCULAR; INTRAVENOUS AS NEEDED
Status: DISCONTINUED | OUTPATIENT
Start: 2018-11-08 | End: 2018-11-08 | Stop reason: HOSPADM

## 2018-11-08 RX ADMIN — SODIUM CHLORIDE 3 ML/KG/HR: 9 INJECTION, SOLUTION INTRAVENOUS at 08:34

## 2018-11-08 RX ADMIN — ASPIRIN 324 MG: 81 TABLET, CHEWABLE ORAL at 08:50

## 2018-11-08 NOTE — OUTREACH NOTE
AMI Week 4 Survey      Responses   Facility patient discharged from?  [place]   Does the patient have one of the following disease processes/diagnoses(primary or secondary)?  Acute MI (STEMI,NSTEMI)   Week 4 attempt successful?  No   Revoke  Readmitted          Madison Bowers RN

## 2018-11-08 NOTE — TELEPHONE ENCOUNTER
I spoke with the patient's daughter as he and his wife were still at the hospital.  I left my direct phone number with her to pass on to them, and asked that they call either later today or tomorrow, at their convenience.  Upon return call I will discuss Repatha with them.  Will await return call.

## 2018-11-09 ENCOUNTER — CALL CENTER PROGRAMS (OUTPATIENT)
Dept: CALL CENTER | Facility: HOSPITAL | Age: 53
End: 2018-11-09

## 2018-11-09 NOTE — OUTREACH NOTE
PCI Survey      Responses   Facility patient discharged from?  [place]   Procedure date  11/08/18   PCI site:  Right, Arm   Performing MD Dr. Giovanny Andre   Attempt successful?  Yes   Call start time  0858   Call end time  0902   Person spoke with today (if not patient) and relationship  Mikayla, spouse + pt   Is the patient taking prescribed medications:  Plavix   Nursing intervention  Reminded to continue to take prescribed medications   Nursing intervention  Patient education provided   Does the patient have an appointment scheduled with the cardiologist?  Yes   Appointment comments  12/10/18   Did the patient feel prepared to go home on the same day as the procedure?  Yes   Is the patient satisfied with the same day discharge process?  Yes   PCI call completed  Yes          Bhargav Callahan RN

## 2018-11-19 ENCOUNTER — DOCUMENTATION (OUTPATIENT)
Dept: CARDIAC REHAB | Facility: HOSPITAL | Age: 53
End: 2018-11-19

## 2018-11-19 NOTE — PROGRESS NOTES
Referral received for Phase II Cardiac Rehab.  Staff reviewed chart and patient has qualifying diagnosis for Phase II Cardiac Rehab.  Staff will contact patient in regards to scheduling or referring to another facility.

## 2018-12-04 ENCOUNTER — TELEPHONE (OUTPATIENT)
Dept: CARDIOLOGY | Facility: CLINIC | Age: 53
End: 2018-12-04

## 2018-12-04 ENCOUNTER — DOCUMENTATION (OUTPATIENT)
Dept: CARDIAC REHAB | Facility: HOSPITAL | Age: 53
End: 2018-12-04

## 2018-12-04 DIAGNOSIS — E78.49 OTHER HYPERLIPIDEMIA: Primary | ICD-10-CM

## 2018-12-04 NOTE — PROGRESS NOTES
Pt. Referred for Phase II Cardiac Rehab. Staff discussed benefits of exercise, program protocol, and educational material provided. Teach back verified.  Permission granted from patient for staff to fax referral information to outlying program at this time.  Staff to fax referral info to [facility].

## 2018-12-04 NOTE — TELEPHONE ENCOUNTER
Pt wife called regarding status of Repatha approval. Pt wife updated on status. All questions answered at this time.

## 2018-12-06 NOTE — PROGRESS NOTES
[facility] AT [address], [address]  [place], [place], [address] [phone]  [email or web address]           OUTPATIENT CLINIC FOLLOW UP NOTE    Patient Care Team:  Patient Care Team:  Dustin Whitney MD as PCP - General (Internal Medicine)    Subjective:      Chief Complaint   Patient presents with   • STEMI   • Hypertension   • Hyperlipidemia       HPI:    Lenny Esparza is a 53 y.o. male.  History of Present Illness    The patient has a history of CAD with STEMI 10/2018 s/p PCI to the LAD and then staged PCI with multiple stents to the RCA, LV thrombus 10/2018, hypertension, hyperlipidemia, PRESTON, restless leg syndrome.  The patient presents today for follow-up.    Since his last heart catheterization the patient has done well clinically.  He finds that he is more energetic.  He denies exertional chest pain, dyspnea.  He underwent a sleep study and was found to have sleep apnea and restless leg syndrome.  He has not started cardiac rehabilitation.  May need a new referral.  Was rejected for Repatha but approved for Praluent    Review of Systems:  Negative for exertional chest pain, dyspnea with exertion, orthopnea, PND, lower extremity edema, palpitations, lightheadedness, syncope.     PFSH:  Patient Active Problem List   Diagnosis   • ST elevation myocardial infarction (STEMI) (CMS/HCC)   • Essential hypertension   • Mixed hyperlipidemia   • Chronic back pain   • Coronary artery disease involving native coronary artery of native heart without angina pectoris   • LV (left ventricular) mural thrombus         Current Outpatient Medications:   •  allopurinol (ZYLOPRIM) 100 MG tablet, Take 100 mg by mouth Daily As Needed (gout flare)., Disp: , Rfl:   •  ALPRAZolam (XANAX) 0.5 MG tablet, Take 0.5 mg by mouth At Night As Needed for Anxiety or Sleep., Disp: , Rfl:   •  aspirin 81 MG chewable tablet, Chew 1 tablet Daily., Disp: 30 tablet, Rfl: 11  •  clopidogrel (PLAVIX) 75 MG  "tablet, Take 1 tablet by mouth Daily., Disp: 30 tablet, Rfl: 11  •  colchicine 0.6 MG tablet, Take 0.6 mg by mouth Daily As Needed (gout flare)., Disp: , Rfl:   •  gabapentin (NEURONTIN) 600 MG tablet, Take 600 mg by mouth 3 (Three) Times a Day., Disp: , Rfl:   •  HYDROcodone-acetaminophen (NORCO)  MG per tablet, Take 1 tablet by mouth 3 (Three) Times a Day., Disp: , Rfl:   •  metoprolol tartrate (LOPRESSOR) 25 MG tablet, Take 0.5 tablets by mouth Every 12 (Twelve) Hours., Disp: 30 tablet, Rfl: 11  •  nitroglycerin (NITROSTAT) 0.4 MG SL tablet, 1 under the tongue as needed for angina, may repeat q5mins for up three doses (Patient taking differently: Place 0.4 mg under the tongue Every 5 (Five) Minutes As Needed for Chest Pain. 1 under the tongue as needed for angina, may repeat q5mins for up three doses. PT. STATED THAT HE HAS NOT HAD TO USE THIS.), Disp: 25 tablet, Rfl: 0  •  Omega-3 Fatty Acids (FISH OIL) 1000 MG capsule capsule, Take 1,000 mg by mouth Daily With Breakfast., Disp: , Rfl:   •  rivaroxaban (XARELTO) 20 MG tablet, Take 1 tablet by mouth Daily With Dinner. (Patient taking differently: Take 20 mg by mouth Every Night.), Disp: 30 tablet, Rfl: 3  •  TESTOSTERONE IM, Inject 1 dose into the appropriate muscle as directed by prescriber Every 14 (Fourteen) Days., Disp: , Rfl:     Current Facility-Administered Medications:   •  Alirocumab solution pen-injector 75 mg, 75 mg, Subcutaneous, Q14 Days, Giovanny Andre MD    Allergies   Allergen Reactions   • Statins Myalgia        reports that  has never smoked. His smokeless tobacco use includes chew.    Family History   Problem Relation Age of Onset   • Heart attack Mother    • Coronary artery disease Mother    • Hypertension Mother    • Diabetes Mother    • No Known Problems Father    • Diabetes Maternal Grandmother          Objective:   Blood pressure 118/70, pulse 67, height 180.3 cm (71\"), weight 113 kg (249 lb), SpO2 96 %.  CONSTITUTIONAL: No acute " distress, normal affect  RESPIRATORY: Normal effort. Clear to auscultation bilaterally without wheezing or rales  CARDIOVASCULAR: Carotids with normal upstrokes without bruits.  Regular rate and rhythm with normal S1 and S2. Without murmur, gallop or rub.  PERIPHERAL VASCULAR: Normal radial pulse. There is no lower extremity edema bilaterally.    Labs:    BUN   Date Value Ref Range Status   11/07/2018 19 9 - 23 mg/dL Final     Creatinine   Date Value Ref Range Status   11/07/2018 0.93 0.60 - 1.30 mg/dL Final     Potassium   Date Value Ref Range Status   11/07/2018 4.6 3.5 - 5.5 mmol/L Final     ALT (SGPT)   Date Value Ref Range Status   11/07/2018 27 7 - 40 U/L Final     AST (SGOT)   Date Value Ref Range Status   11/07/2018 23 0 - 33 U/L Final       Lab Results   Component Value Date    CHOL 172 11/08/2018    CHOL 146 10/11/2018     Lab Results   Component Value Date    TRIG 121 11/08/2018    TRIG 113 10/11/2018     Lab Results   Component Value Date    HDL 34 (L) 11/08/2018    HDL 28 (L) 10/11/2018     No components found for: LDLCALC  No results found for: VLDL  No results found for: LDLHDL    Diagnostic Data:    Procedures    TTE 10/2018  · Left ventricular systolic function is normal. Estimated EF = 50%.  · Left ventricular wall thickness is consistent with mild concentric hypertrophy.  · The left ventricular cavity is borderline dilated.  · The following left ventricular wall segments are hypokinetic: mid anterior, apical anterior, apical inferior, apical septal, apex hypokinetic and mid anteroseptal.  · Left ventricular diastolic dysfunction (grade I a) consistent with impaired relaxation.  · A echogenic left ventricular thrombus is present. The left ventricular thrombus is fixed and located in the apex.    [facility] 11/2018  · There is a 50% diffuse proximal to mid RCA stenosis, 50% distal RCA to RPL last stenosis, and a 90% discrete ostial RPDA stenosis.  These lesions were found to be functionally significant by  iFR with a ratio of 0.86 in the RPL S and 0.85 in the RPDA.  The lesions are now status post multiple tandem stents.  There is a Xience Summer 3.5 x 38 mm drug-eluting stent in the proximal to mid RCA, a Xience Summer 3.5 x 38 mm drug-eluting stent from the mid to distal RCA, a Tryton bifurcation 3.5 mm main branch-2.5 mm side branch stent from the distal RCA to the RPDA, and a final Xience Summer 3.0 x 23 mm drug-eluting stent from the distal RCA into the ostial RPL S.    [facility] 10/2018  · There is a 100% stenosis of the mid LAD that is now status post intervention with a Xience Summer 3.0 x 23 mm drug-eluting stent and post dilatation with a 3.5 mm noncompliant balloon.  · There is a residual 60% tubular proximal RCA stenosis as well as a Alonso class (1,1,1) bifurcation stenosis in the distal RCA at the bifurcation of the RPL S and RPDA   · Left ventricular ejection fraction 50% with hypokinesis of the anterior and apical segments.  · Frequent PVCs noted throughout the study    Assessment and Plan:   Lenny was seen today for stemi, hypertension and hyperlipidemia.    Diagnoses and all orders for this visit:    Coronary artery disease involving native coronary artery of native heart without angina pectoris  Mixed hyperlipidemia  -STEMI 10/2018, PCI to LAD.  Staged intervention of the RCA with multiple stents  -CCS class 0  -Continue current medications  -Cardiac rehabilitation excellent-aggressive lifestyle and risk factor modification for CAD  -Start Praluent. Repeat LFTs/Lipid panel at next visit    LV (left ventricular) mural thrombus  -Continue Xarelto till 4/2018    Essential hypertension  -Continue current related medications      - Return in about 3 months (around 3/10/2019).    Giovanny Andre MD, MSc, [facility]  Interventional Cardiology  [facility] at [facility]

## 2018-12-10 ENCOUNTER — OFFICE VISIT (OUTPATIENT)
Dept: CARDIOLOGY | Facility: CLINIC | Age: 53
End: 2018-12-10

## 2018-12-10 VITALS
HEART RATE: 67 BPM | WEIGHT: 249 LBS | OXYGEN SATURATION: 96 % | BODY MASS INDEX: 34.86 KG/M2 | HEIGHT: 71 IN | DIASTOLIC BLOOD PRESSURE: 70 MMHG | SYSTOLIC BLOOD PRESSURE: 118 MMHG

## 2018-12-10 DIAGNOSIS — I51.3 LV (LEFT VENTRICULAR) MURAL THROMBUS: ICD-10-CM

## 2018-12-10 DIAGNOSIS — I10 ESSENTIAL HYPERTENSION: ICD-10-CM

## 2018-12-10 DIAGNOSIS — E78.2 MIXED HYPERLIPIDEMIA: ICD-10-CM

## 2018-12-10 DIAGNOSIS — I25.10 CORONARY ARTERY DISEASE INVOLVING NATIVE CORONARY ARTERY OF NATIVE HEART WITHOUT ANGINA PECTORIS: Primary | ICD-10-CM

## 2018-12-10 PROCEDURE — 99214 OFFICE O/P EST MOD 30 MIN: CPT | Performed by: INTERNAL MEDICINE

## 2018-12-12 ENCOUNTER — TELEPHONE (OUTPATIENT)
Dept: CARDIOLOGY | Facility: CLINIC | Age: 53
End: 2018-12-12

## 2018-12-12 RX ORDER — NITROGLYCERIN 0.4 MG/1
TABLET SUBLINGUAL
Qty: 20 TABLET | Refills: 0 | Status: SHIPPED | OUTPATIENT
Start: 2018-12-12

## 2019-01-08 DIAGNOSIS — E78.00 HIGH CHOLESTEROL: Primary | ICD-10-CM

## 2019-01-17 ENCOUNTER — TELEPHONE (OUTPATIENT)
Dept: CARDIOLOGY | Facility: CLINIC | Age: 54
End: 2019-01-17

## 2019-01-17 NOTE — TELEPHONE ENCOUNTER
Patient Assistance form faxed to [employer] [facility] for Repatha   Fax# [phone]    Pts wife made aware of faxing form. Pt will be contacted via company if any other documents are needed.

## 2019-05-01 ENCOUNTER — OFFICE VISIT (OUTPATIENT)
Dept: CARDIOLOGY | Facility: CLINIC | Age: 54
End: 2019-05-01

## 2019-05-01 VITALS
SYSTOLIC BLOOD PRESSURE: 124 MMHG | BODY MASS INDEX: 36.54 KG/M2 | OXYGEN SATURATION: 96 % | HEIGHT: 71 IN | WEIGHT: 261 LBS | DIASTOLIC BLOOD PRESSURE: 78 MMHG | HEART RATE: 70 BPM

## 2019-05-01 DIAGNOSIS — I51.3 LV (LEFT VENTRICULAR) MURAL THROMBUS: ICD-10-CM

## 2019-05-01 DIAGNOSIS — E78.2 MIXED HYPERLIPIDEMIA: ICD-10-CM

## 2019-05-01 DIAGNOSIS — I10 ESSENTIAL HYPERTENSION: ICD-10-CM

## 2019-05-01 DIAGNOSIS — I25.10 CORONARY ARTERY DISEASE INVOLVING NATIVE CORONARY ARTERY OF NATIVE HEART WITHOUT ANGINA PECTORIS: Primary | ICD-10-CM

## 2019-05-01 PROBLEM — I21.3 ST ELEVATION MYOCARDIAL INFARCTION (STEMI) (HCC): Status: RESOLVED | Noted: 2018-10-10 | Resolved: 2019-05-01

## 2019-05-01 PROCEDURE — 99214 OFFICE O/P EST MOD 30 MIN: CPT | Performed by: INTERNAL MEDICINE

## 2019-05-01 RX ORDER — LISINOPRIL 10 MG/1
10 TABLET ORAL DAILY
COMMUNITY
End: 2022-08-29

## 2019-05-02 ENCOUNTER — DOCUMENTATION (OUTPATIENT)
Dept: CARDIOLOGY | Facility: CLINIC | Age: 54
End: 2019-05-02

## 2019-05-02 NOTE — PROGRESS NOTES
Labs from PCPs office 1/24/2019  Hemoglobin 16.1  Hematocrit 47.3  Platelets 141  BUN 12  Creatinine 0.84  Sodium 141  Potassium 4.3  AST 20  ALT 21  Total cholesterol 150  Triglycerides 120  HDL 30  LDL 96  Hemoglobin A1c 5.8%  TSH 1.310

## 2019-05-31 ENCOUNTER — HOSPITAL ENCOUNTER (OUTPATIENT)
Dept: CARDIOLOGY | Facility: HOSPITAL | Age: 54
Discharge: HOME | End: 2019-05-31
Admitting: INTERNAL MEDICINE

## 2019-05-31 VITALS — BODY MASS INDEX: 36.54 KG/M2 | WEIGHT: 261 LBS | HEIGHT: 71 IN

## 2019-05-31 DIAGNOSIS — I51.3 LV (LEFT VENTRICULAR) MURAL THROMBUS: ICD-10-CM

## 2019-05-31 LAB
BH CV ECHO MEAS - AO ROOT AREA (BSA CORRECTED): 1.6
BH CV ECHO MEAS - AO ROOT AREA: 10.5 CM^2
BH CV ECHO MEAS - AO ROOT DIAM: 3.7 CM
BH CV ECHO MEAS - BSA(HAYCOCK): 2.5 M^2
BH CV ECHO MEAS - BSA: 2.4 M^2
BH CV ECHO MEAS - BZI_BMI: 36.4 KILOGRAMS/M^2
BH CV ECHO MEAS - BZI_METRIC_HEIGHT: 180.3 CM
BH CV ECHO MEAS - BZI_METRIC_WEIGHT: 118.4 KG
BH CV ECHO MEAS - EDV(CUBED): 202.5 ML
BH CV ECHO MEAS - EDV(MOD-SP2): 117 ML
BH CV ECHO MEAS - EDV(MOD-SP4): 167 ML
BH CV ECHO MEAS - EDV(TEICH): 171.3 ML
BH CV ECHO MEAS - EF(CUBED): 67.1 %
BH CV ECHO MEAS - EF(MOD-BP): 58 %
BH CV ECHO MEAS - EF(MOD-SP2): 65.8 %
BH CV ECHO MEAS - EF(MOD-SP4): 51.5 %
BH CV ECHO MEAS - EF(TEICH): 57.9 %
BH CV ECHO MEAS - ESV(CUBED): 66.5 ML
BH CV ECHO MEAS - ESV(MOD-SP2): 40 ML
BH CV ECHO MEAS - ESV(MOD-SP4): 81 ML
BH CV ECHO MEAS - ESV(TEICH): 72.2 ML
BH CV ECHO MEAS - FS: 31 %
BH CV ECHO MEAS - IVS/LVPW: 0.94
BH CV ECHO MEAS - IVSD: 0.96 CM
BH CV ECHO MEAS - LA DIMENSION: 3.9 CM
BH CV ECHO MEAS - LA/AO: 1.1
BH CV ECHO MEAS - LAT PEAK E' VEL: 15.9 CM/SEC
BH CV ECHO MEAS - LV DIASTOLIC VOL/BSA (35-75): 70.7 ML/M^2
BH CV ECHO MEAS - LV MASS(C)D: 233.8 GRAMS
BH CV ECHO MEAS - LV MASS(C)DI: 99 GRAMS/M^2
BH CV ECHO MEAS - LV SYSTOLIC VOL/BSA (12-30): 34.3 ML/M^2
BH CV ECHO MEAS - LVIDD: 5.9 CM
BH CV ECHO MEAS - LVIDS: 4.1 CM
BH CV ECHO MEAS - LVLD AP2: 9.5 CM
BH CV ECHO MEAS - LVLD AP4: 10 CM
BH CV ECHO MEAS - LVLS AP2: 8 CM
BH CV ECHO MEAS - LVLS AP4: 9 CM
BH CV ECHO MEAS - LVOT AREA (M): 3.1 CM^2
BH CV ECHO MEAS - LVOT AREA: 3.2 CM^2
BH CV ECHO MEAS - LVOT DIAM: 2 CM
BH CV ECHO MEAS - LVPWD: 1 CM
BH CV ECHO MEAS - MED PEAK E' VEL: 9.1 CM/SEC
BH CV ECHO MEAS - SI(CUBED): 57.6 ML/M^2
BH CV ECHO MEAS - SI(MOD-SP2): 32.6 ML/M^2
BH CV ECHO MEAS - SI(MOD-SP4): 36.4 ML/M^2
BH CV ECHO MEAS - SI(TEICH): 42 ML/M^2
BH CV ECHO MEAS - SV(CUBED): 135.9 ML
BH CV ECHO MEAS - SV(MOD-SP2): 77 ML
BH CV ECHO MEAS - SV(MOD-SP4): 86 ML
BH CV ECHO MEAS - SV(TEICH): 99.1 ML
BH CV ECHO MEAS - TAPSE (>1.6): 2.7 CM2
BH CV VAS BP LEFT ARM: NORMAL MMHG
BH CV XLRA - RV BASE: 3.8 CM
BH CV XLRA - RV LENGTH: 10.1 CM
BH CV XLRA - RV MID: 2.8 CM
BH CV XLRA - TDI S': 14.7 CM/SEC
LV EF 2D ECHO EST: 50 %

## 2019-05-31 PROCEDURE — 93306 TTE W/DOPPLER COMPLETE: CPT | Performed by: INTERNAL MEDICINE

## 2019-05-31 PROCEDURE — 25010000002 SULFUR HEXAFLUORIDE MICROSPH 60.7-25 MG RECONSTITUTED SUSPENSION: Performed by: INTERNAL MEDICINE

## 2019-05-31 PROCEDURE — 93308 TTE F-UP OR LMTD: CPT

## 2019-05-31 PROCEDURE — 93325 DOPPLER ECHO COLOR FLOW MAPG: CPT

## 2019-05-31 PROCEDURE — 93321 DOPPLER ECHO F-UP/LMTD STD: CPT

## 2019-05-31 RX ADMIN — SULFUR HEXAFLUORIDE 5 ML: KIT at 09:35

## 2019-06-03 ENCOUNTER — TELEPHONE (OUTPATIENT)
Dept: CARDIOLOGY | Facility: CLINIC | Age: 54
End: 2019-06-03

## 2019-06-03 NOTE — TELEPHONE ENCOUNTER
----- Message from Giovanny Andre MD sent at 5/31/2019  5:47 PM EDT -----  Can you let the patient know there is no left ventricular thrombus present on his repeat echo.  He can discontinue Xarelto and restart aspirin 81 mg daily.

## 2019-06-04 ENCOUNTER — TELEPHONE (OUTPATIENT)
Dept: CARDIOLOGY | Facility: CLINIC | Age: 54
End: 2019-06-04

## 2019-06-04 NOTE — TELEPHONE ENCOUNTER
Pt called to review echo results and recommendations per [facility]. Pt advised to discontinue Xarelto and restart ASA 81 mg daily. Pt verbalizes understanding, all questions answered at this time.

## 2022-08-22 ENCOUNTER — TELEPHONE (OUTPATIENT)
Dept: CARDIOLOGY | Facility: CLINIC | Age: 57
End: 2022-08-22

## 2022-08-24 NOTE — PROGRESS NOTES
[facility]  [address], [address]  [place], [place], [address]    [phone]  WWW.[facility]           OUTPATIENT CLINIC CONSULTATION NOTE    Patient care team:  Patient Care Team:  Dustin Whitney MD as PCP - General (Internal Medicine)    Requesting Provider and Reason for consultation: The patient is being seen today at the request of Dr Whitney for Dyspnea    Subjective:   Chief complaint:   Chief Complaint   Patient presents with   • Coronary Artery Disease   • Shortness of Breath   • Fatigue       HPI:    Lenny Esparza is a 57 y.o. male.  Cardiac focused problem list:  1. CAD  a. STEMI 10/2018, status post PCI to LAD and then staged PCI with multiple stents to the RCA, 11/2018  2. Hypertension  3. Hyperlipidemia  4. LV thrombus, 2018  5. Chronic back pain  6. PRESTON  7. Restless leg syndrome    Patient presents today for consultation.  He has been followed by Dr. Andre in the past.  He has a history of STEMI in 2018 with PCI to the LAD and staged PCI to the RCA.  He has not had cardiology follow-up since then due to the pandemic.    Overall has been doing well from a cardiac standpoint for the last several years.  However, he has had some progressively worsening shortness of breath over the last 3 months.  Also notes he has been less active over the last 9 to 10 months due to several hospitalizations early in the year for pneumonia.  Patient notes he was hospitalized in [place] in February and again in March for possible aspiration pneumonia and pulmonary embolus.  He also has a bad disc in his back which limits his activity.    Denies chest pain, palpitations, lower extremity edema, lightheadedness or syncope.      Review of Systems:  As noted above in the HPI    PFSH:  Patient Active Problem List   Diagnosis   • Essential hypertension   • Mixed hyperlipidemia   • Chronic back pain   • Coronary artery disease involving native coronary artery of native heart  without angina pectoris   • LV (left ventricular) mural thrombus         Current Outpatient Medications:   •  allopurinol (ZYLOPRIM) 100 MG tablet, Take 100 mg by mouth Daily As Needed (gout flare)., Disp: , Rfl:   •  ALPRAZolam (XANAX) 0.5 MG tablet, Take 0.5 mg by mouth At Night As Needed for Anxiety or Sleep., Disp: , Rfl:   •  aspirin 81 MG chewable tablet, Chew 1 tablet Daily., Disp: 30 tablet, Rfl: 11  •  colchicine 0.6 MG tablet, Take 0.6 mg by mouth Daily As Needed (gout flare)., Disp: , Rfl:   •  escitalopram (LEXAPRO) 10 MG tablet, Daily., Disp: , Rfl:   •  esomeprazole (nexIUM) 40 MG capsule, As Needed., Disp: , Rfl:   •  Evolocumab 140 MG/ML solution auto-injector, Inject 1 mL under the skin into the appropriate area as directed Every 14 (Fourteen) Days., Disp: 2 pen, Rfl: 11  •  fluticasone (FLONASE) 50 MCG/ACT nasal spray, As Needed., Disp: , Rfl:   •  gabapentin (NEURONTIN) 600 MG tablet, Take 600 mg by mouth 3 (Three) Times a Day., Disp: , Rfl:   •  gabapentin (NEURONTIN) 800 MG tablet, Take 800 mg by mouth Every 8 (Eight) Hours As Needed., Disp: , Rfl:   •  HYDROcodone-acetaminophen (NORCO)  MG per tablet, Take 1 tablet by mouth 3 (Three) Times a Day., Disp: , Rfl:   •  lisinopril (PRINIVIL,ZESTRIL) 30 MG tablet, Daily., Disp: , Rfl:   •  metoprolol tartrate (LOPRESSOR) 25 MG tablet, TAKE 1/2 TABLET BY MOUTH EVERY 12 HOURS, Disp: 30 tablet, Rfl: 0  •  nitroglycerin (NITROSTAT) 0.4 MG SL tablet, 1 under the tongue as needed for angina, may repeat q5mins for up three doses, Disp: 20 tablet, Rfl: 0  •  O2 (OXYGEN), Inhale 2 L/min Every Night., Disp: , Rfl:   •  Ozempic, 0.25 or 0.5 MG/DOSE, 2 MG/1.5ML solution pen-injector, 1 (One) Time Per Week., Disp: , Rfl:   •  TESTOSTERONE IM, Inject 1 dose into the appropriate muscle as directed by prescriber Every 14 (Fourteen) Days., Disp: , Rfl:   •  Xarelto 20 MG tablet, Daily., Disp: , Rfl:     Allergies   Allergen Reactions   • Statins Myalgia  "      Social History     Socioeconomic History   • Marital status:    Tobacco Use   • Smoking status: Never Smoker   • Smokeless tobacco: Current User     Types: Chew   • Tobacco comment: 1 can chew daily    Substance and Sexual Activity   • Alcohol use: Yes     Comment: rare   • Drug use: No   • Sexual activity: Defer     Family History   Problem Relation Age of Onset   • Heart attack Mother    • Coronary artery disease Mother    • Hypertension Mother    • Diabetes Mother    • No Known Problems Father    • Diabetes Maternal Grandmother          Objective:   Physical Exam:  /66 (BP Location: Right arm, Patient Position: Sitting)   Pulse 80   Ht 179.1 cm (70.5\")   Wt 129 kg (285 lb)   SpO2 96%   BMI 40.32 kg/m²   CONSTITUTIONAL: No acute distress  RESPIRATORY: Normal effort. Clear to auscultation bilaterally without wheezing or rales  CARDIOVASCULAR: Regular rate and rhythm with normal S1 and S2. Without murmur.  PERIPHERAL VASCULAR: Normal radial pulse. There is no lower extremity edema bilaterally.      Labs:  Labs reviewed by myself  BUN   Date Value Ref Range Status   11/07/2018 19 9 - 23 mg/dL Final     Creatinine   Date Value Ref Range Status   11/07/2018 0.93 0.60 - 1.30 mg/dL Final     Potassium   Date Value Ref Range Status   11/07/2018 4.6 3.5 - 5.5 mmol/L Final     ALT (SGPT)   Date Value Ref Range Status   11/07/2018 27 7 - 40 U/L Final     AST (SGOT)   Date Value Ref Range Status   11/07/2018 23 0 - 33 U/L Final       Lab Results   Component Value Date    CHOL 172 11/08/2018     Lab Results   Component Value Date    TRIG 121 11/08/2018     Lab Results   Component Value Date    HDL 34 (L) 11/08/2018     Lab Results   Component Value Date     11/08/2018     No components found for: LDLDIRECTC    Diagnostic Data:      ECG 12 Lead    Date/Time: 8/29/2022 9:39 AM  Performed by: Giovanny Andre MD  Authorized by: Giovanny Andre MD   Comparison: compared with previous ECG from " 10/11/2018  Comparison to previous ECG: Anterior ST elevation improved  Rhythm: sinus rhythm  Rate: normal  BPM: 80  Conduction: right bundle branch block  Comments: Left axis deviation.             TTE 10/2018  · Left ventricular systolic function is normal. Estimated EF = 50%.  · Left ventricular wall thickness is consistent with mild concentric hypertrophy.  · The left ventricular cavity is borderline dilated.  · The following left ventricular wall segments are hypokinetic: mid anterior, apical anterior, apical inferior, apical septal, apex hypokinetic and mid anteroseptal.  · Left ventricular diastolic dysfunction (grade I a) consistent with impaired relaxation.  · A echogenic left ventricular thrombus is present. The left ventricular thrombus is fixed and located in the apex.     TTE 5/31/2019  · Left ventricular systolic function is normal. Estimated EF = 50%.  · The following left ventricular wall segments are hypokinetic: apical inferior, apical septal and apex hypokinetic.  · The left ventricular cavity is borderline dilated.  · There is no evidence of a left ventricular thrombus present.      [facility] 11/2018  · There is a 50% diffuse proximal to mid RCA stenosis, 50% distal RCA to RPL last stenosis, and a 90% discrete ostial RPDA stenosis.  These lesions were found to be functionally significant by iFR with a ratio of 0.86 in the RPL S and 0.85 in the RPDA.  The lesions are now status post multiple tandem stents.  There is a Xience Summer 3.5 x 38 mm drug-eluting stent in the proximal to mid RCA, a Xience Summer 3.5 x 38 mm drug-eluting stent from the mid to distal RCA, a Tryton bifurcation 3.5 mm main branch-2.5 mm side branch stent from the distal RCA to the RPDA, and a final Xience Summer 3.0 x 23 mm drug-eluting stent from the distal RCA into the ostial RPL S.     [facility] 10/2018  · There is a 100% stenosis of the mid LAD that is now status post intervention with a Xience Summer 3.0 x 23 mm drug-eluting stent  and post dilatation with a 3.5 mm noncompliant balloon.  · There is a residual 60% tubular proximal RCA stenosis as well as a Alonso class (1,1,1) bifurcation stenosis in the distal RCA at the bifurcation of the RPL S and RPDA   · Left ventricular ejection fraction 50% with hypokinesis of the anterior and apical segments.  · Frequent PVCs noted throughout the study        Assessment and Plan:     Coronary artery disease involving native coronary artery of native heart without angina pectoris  History of left ventricular mural thrombus  Mixed hyperlipidemia  Dyspnea on exertion  -Progressively worsening dyspnea on exertion over the last several months.   -Lexiscan stress test to evaluate for evidence of ischemia.   -Echocardiogram to evaluate for LV dysfunction in setting of possible angina and right heart strain status post PE earlier this year.   -Will discontinue clopidogrel for now.  -Continue aspirin, Xarelto.    Pulmonary emboli  -Bilateral PE's in setting of pneumonia, 3/2022.  -Continue Xarelto, management per PCP.   -After completing Xarelto for PE, would consider switching dose to Xarelto 2.5 mg twice daily due to CAD/PCI history    Essential hypertension  -Blood pressure at goal.   -Continue current related medications.        - Return in about 1 year (around 8/29/2023) for Next scheduled follow up with EKG .    Scribed for Giovanny Andre MD by LINNETTE Morin. 8/29/2022  10:38 EDT    I, Giovanny Andre MD, personally performed the services as scribed by the above named individual. I have made any necessary edits and it is both accurate and complete.     Giovanny Ander MD, MSc, FACC, [place]  Interventional Cardiology  [facility]

## 2022-08-29 ENCOUNTER — CONSULT (OUTPATIENT)
Dept: CARDIOLOGY | Facility: CLINIC | Age: 57
End: 2022-08-29

## 2022-08-29 VITALS
DIASTOLIC BLOOD PRESSURE: 66 MMHG | BODY MASS INDEX: 39.9 KG/M2 | HEIGHT: 71 IN | SYSTOLIC BLOOD PRESSURE: 110 MMHG | WEIGHT: 285 LBS | HEART RATE: 80 BPM | OXYGEN SATURATION: 96 %

## 2022-08-29 DIAGNOSIS — E78.2 MIXED HYPERLIPIDEMIA: ICD-10-CM

## 2022-08-29 DIAGNOSIS — I51.3 LV (LEFT VENTRICULAR) MURAL THROMBUS: ICD-10-CM

## 2022-08-29 DIAGNOSIS — R06.09 DYSPNEA ON EXERTION: ICD-10-CM

## 2022-08-29 DIAGNOSIS — I25.10 CORONARY ARTERY DISEASE INVOLVING NATIVE CORONARY ARTERY OF NATIVE HEART WITHOUT ANGINA PECTORIS: Primary | ICD-10-CM

## 2022-08-29 DIAGNOSIS — I10 ESSENTIAL HYPERTENSION: ICD-10-CM

## 2022-08-29 PROCEDURE — 93000 ELECTROCARDIOGRAM COMPLETE: CPT | Performed by: INTERNAL MEDICINE

## 2022-08-29 PROCEDURE — 99204 OFFICE O/P NEW MOD 45 MIN: CPT | Performed by: INTERNAL MEDICINE

## 2022-08-29 RX ORDER — SEMAGLUTIDE 1.34 MG/ML
INJECTION, SOLUTION SUBCUTANEOUS WEEKLY
COMMUNITY
Start: 2022-07-28

## 2022-08-29 RX ORDER — ESCITALOPRAM OXALATE 10 MG/1
TABLET ORAL DAILY
COMMUNITY
Start: 2022-08-01

## 2022-08-29 RX ORDER — GABAPENTIN 800 MG/1
800 TABLET ORAL EVERY 8 HOURS PRN
COMMUNITY
Start: 2022-08-22

## 2022-08-29 RX ORDER — ESOMEPRAZOLE MAGNESIUM 40 MG/1
CAPSULE, DELAYED RELEASE ORAL AS NEEDED
COMMUNITY
Start: 2022-08-01

## 2022-08-29 RX ORDER — FLUTICASONE PROPIONATE 50 MCG
SPRAY, SUSPENSION (ML) NASAL AS NEEDED
COMMUNITY
Start: 2022-07-23

## 2022-08-29 RX ORDER — RIVAROXABAN 20 MG/1
TABLET, FILM COATED ORAL DAILY
COMMUNITY
Start: 2022-08-04

## 2022-08-29 RX ORDER — LISINOPRIL 30 MG/1
TABLET ORAL DAILY
COMMUNITY
Start: 2022-07-28

## 2022-09-30 ENCOUNTER — HOSPITAL ENCOUNTER (OUTPATIENT)
Dept: CARDIOLOGY | Facility: HOSPITAL | Age: 57
Discharge: HOME | End: 2022-09-30

## 2022-09-30 DIAGNOSIS — R06.09 DYSPNEA ON EXERTION: ICD-10-CM

## 2022-09-30 DIAGNOSIS — I25.10 CORONARY ARTERY DISEASE INVOLVING NATIVE CORONARY ARTERY OF NATIVE HEART WITHOUT ANGINA PECTORIS: ICD-10-CM

## 2022-09-30 LAB
BH CV ECHO MEAS - AO MAX PG: 11.2 MMHG
BH CV ECHO MEAS - AO MEAN PG: 6 MMHG
BH CV ECHO MEAS - AO ROOT DIAM: 3.3 CM
BH CV ECHO MEAS - AO V2 MAX: 167 CM/SEC
BH CV ECHO MEAS - AO V2 VTI: 29.5 CM
BH CV ECHO MEAS - AVA(I,D): 2.09 CM2
BH CV ECHO MEAS - EDV(CUBED): 148.9 ML
BH CV ECHO MEAS - EDV(MOD-SP2): 198 ML
BH CV ECHO MEAS - EDV(MOD-SP4): 174 ML
BH CV ECHO MEAS - EF(MOD-BP): 52.4 %
BH CV ECHO MEAS - EF(MOD-SP2): 52.9 %
BH CV ECHO MEAS - EF(MOD-SP4): 50 %
BH CV ECHO MEAS - ESV(CUBED): 54.9 ML
BH CV ECHO MEAS - ESV(MOD-SP2): 93.2 ML
BH CV ECHO MEAS - ESV(MOD-SP4): 87 ML
BH CV ECHO MEAS - FS: 28.3 %
BH CV ECHO MEAS - IVS/LVPW: 1 CM
BH CV ECHO MEAS - IVSD: 1 CM
BH CV ECHO MEAS - LA DIMENSION: 4.2 CM
BH CV ECHO MEAS - LAT PEAK E' VEL: 8.5 CM/SEC
BH CV ECHO MEAS - LV DIASTOLIC VOL/BSA (35-75): 71.7 CM2
BH CV ECHO MEAS - LV MASS(C)D: 200.4 GRAMS
BH CV ECHO MEAS - LV MAX PG: 1.48 MMHG
BH CV ECHO MEAS - LV MEAN PG: 1 MMHG
BH CV ECHO MEAS - LV SYSTOLIC VOL/BSA (12-30): 35.9 CM2
BH CV ECHO MEAS - LV V1 MAX: 60.9 CM/SEC
BH CV ECHO MEAS - LV V1 VTI: 13.6 CM
BH CV ECHO MEAS - LVIDD: 5.3 CM
BH CV ECHO MEAS - LVIDS: 3.8 CM
BH CV ECHO MEAS - LVOT AREA: 4.5 CM2
BH CV ECHO MEAS - LVOT DIAM: 2.4 CM
BH CV ECHO MEAS - LVPWD: 1 CM
BH CV ECHO MEAS - MED PEAK E' VEL: 5.9 CM/SEC
BH CV ECHO MEAS - MV A MAX VEL: 85.6 CM/SEC
BH CV ECHO MEAS - MV DEC SLOPE: 205 CM/SEC2
BH CV ECHO MEAS - MV DEC TIME: 0.24 MSEC
BH CV ECHO MEAS - MV E MAX VEL: 62.7 CM/SEC
BH CV ECHO MEAS - MV E/A: 0.73
BH CV ECHO MEAS - MV MAX PG: 3.5 MMHG
BH CV ECHO MEAS - MV MEAN PG: 1 MMHG
BH CV ECHO MEAS - MV P1/2T: 91.7 MSEC
BH CV ECHO MEAS - MV V2 VTI: 24.4 CM
BH CV ECHO MEAS - MVA(P1/2T): 2.4 CM2
BH CV ECHO MEAS - MVA(VTI): 2.5 CM2
BH CV ECHO MEAS - PA ACC TIME: 0.1 SEC
BH CV ECHO MEAS - PA PR(ACCEL): 36.3 MMHG
BH CV ECHO MEAS - SI(MOD-SP2): 43.2 ML/M2
BH CV ECHO MEAS - SI(MOD-SP4): 35.9 ML/M2
BH CV ECHO MEAS - SV(LVOT): 61.5 ML
BH CV ECHO MEAS - SV(MOD-SP2): 104.8 ML
BH CV ECHO MEAS - SV(MOD-SP4): 87 ML
BH CV ECHO MEAS - TAPSE (>1.6): 2.8 CM
BH CV ECHO MEASUREMENTS AVERAGE E/E' RATIO: 8.71
BH CV XLRA - RV BASE: 4.8 CM
BH CV XLRA - RV LENGTH: 8 CM
BH CV XLRA - RV MID: 4.3 CM
BH CV XLRA - TDI S': 13.8 CM/SEC
MAXIMAL PREDICTED HEART RATE: 165 BPM
STRESS TARGET HR: 140 BPM

## 2022-09-30 PROCEDURE — 93017 CV STRESS TEST TRACING ONLY: CPT

## 2022-09-30 PROCEDURE — 78452 HT MUSCLE IMAGE SPECT MULT: CPT

## 2022-09-30 PROCEDURE — 93306 TTE W/DOPPLER COMPLETE: CPT

## 2022-09-30 PROCEDURE — 78452 HT MUSCLE IMAGE SPECT MULT: CPT | Performed by: INTERNAL MEDICINE

## 2022-09-30 PROCEDURE — A9500 TC99M SESTAMIBI: HCPCS | Performed by: HOSPITALIST

## 2022-09-30 PROCEDURE — 25010000002 REGADENOSON 0.4 MG/5ML SOLUTION: Performed by: HOSPITALIST

## 2022-09-30 PROCEDURE — 93018 CV STRESS TEST I&R ONLY: CPT | Performed by: INTERNAL MEDICINE

## 2022-09-30 PROCEDURE — 93306 TTE W/DOPPLER COMPLETE: CPT | Performed by: INTERNAL MEDICINE

## 2022-09-30 PROCEDURE — 0 TECHNETIUM SESTAMIBI: Performed by: HOSPITALIST

## 2022-09-30 RX ADMIN — REGADENOSON 0.4 MG: 0.08 INJECTION, SOLUTION INTRAVENOUS at 10:39

## 2022-09-30 RX ADMIN — TECHNETIUM TC 99M SESTAMIBI 1 DOSE: 1 INJECTION INTRAVENOUS at 10:40

## 2022-09-30 RX ADMIN — TECHNETIUM TC 99M SESTAMIBI 1 DOSE: 1 INJECTION INTRAVENOUS at 08:45

## 2022-10-03 LAB
BH CV REST NUCLEAR ISOTOPE DOSE: 9.6 MCI
BH CV STRESS BP STAGE 1: NORMAL
BH CV STRESS BP STAGE 3: NORMAL
BH CV STRESS COMMENTS STAGE 1: NORMAL
BH CV STRESS DOSE REGADENOSON STAGE 1: 0.4
BH CV STRESS DURATION MIN STAGE 1: 1
BH CV STRESS DURATION MIN STAGE 2: 1
BH CV STRESS DURATION MIN STAGE 3: 1
BH CV STRESS DURATION MIN STAGE 4: 1
BH CV STRESS DURATION SEC STAGE 1: 0
BH CV STRESS DURATION SEC STAGE 2: 0
BH CV STRESS DURATION SEC STAGE 3: 0
BH CV STRESS DURATION SEC STAGE 4: 0
BH CV STRESS HR STAGE 1: 65
BH CV STRESS HR STAGE 2: 76
BH CV STRESS HR STAGE 3: 77
BH CV STRESS HR STAGE 4: 75
BH CV STRESS NUCLEAR ISOTOPE DOSE: 33 MCI
BH CV STRESS O2 STAGE 1: 96
BH CV STRESS O2 STAGE 2: 97
BH CV STRESS O2 STAGE 3: 95
BH CV STRESS O2 STAGE 4: 95
BH CV STRESS PROTOCOL 1: NORMAL
BH CV STRESS RECOVERY BP: NORMAL MMHG
BH CV STRESS RECOVERY HR: 72 BPM
BH CV STRESS RECOVERY O2: 95 %
BH CV STRESS STAGE 1: 1
BH CV STRESS STAGE 2: 2
BH CV STRESS STAGE 3: 3
BH CV STRESS STAGE 4: 4
LV EF NUC BP: 42 %
MAXIMAL PREDICTED HEART RATE: 165 BPM
PERCENT MAX PREDICTED HR: 50.3 %
STRESS BASELINE BP: NORMAL MMHG
STRESS BASELINE HR: 68 BPM
STRESS O2 SAT REST: 94 %
STRESS PERCENT HR: 59 %
STRESS POST ESTIMATED WORKLOAD: 1 METS
STRESS POST EXERCISE DUR MIN: 4 MIN
STRESS POST EXERCISE DUR SEC: 0 SEC
STRESS POST O2 SAT PEAK: 95 %
STRESS POST PEAK BP: NORMAL MMHG
STRESS POST PEAK HR: 83 BPM
STRESS TARGET HR: 140 BPM

## 2022-10-06 ENCOUNTER — TELEPHONE (OUTPATIENT)
Dept: CARDIOLOGY | Facility: CLINIC | Age: 57
End: 2022-10-06

## 2022-10-06 NOTE — PROGRESS NOTES
Mr. Esparza, your stress test showed evidence of old blockage but no new blockage. Your echocardiogram looked ok.  You should continue your current medical therapy and let us know if your breathing gets worse or if you develop chest pain.  If so, we would consider doing another heart catheterization.  Please let us know if you have any questions.

## 2024-10-28 ENCOUNTER — PREP FOR SURGERY (OUTPATIENT)
Dept: OTHER | Facility: HOSPITAL | Age: 59
End: 2024-10-28
Payer: MEDICARE

## 2024-10-28 ENCOUNTER — TELEPHONE (OUTPATIENT)
Dept: CARDIOLOGY | Facility: CLINIC | Age: 59
End: 2024-10-28
Payer: MEDICARE

## 2024-10-28 DIAGNOSIS — R74.8 CARDIAC ENZYMES ELEVATED: Primary | ICD-10-CM

## 2024-10-28 DIAGNOSIS — R94.39 ABNORMAL RESULT OF OTHER CARDIOVASCULAR FUNCTION STUDY: ICD-10-CM

## 2024-10-28 RX ORDER — SODIUM CHLORIDE 0.9 % (FLUSH) 0.9 %
10 SYRINGE (ML) INJECTION EVERY 12 HOURS SCHEDULED
OUTPATIENT
Start: 2024-10-28

## 2024-10-28 RX ORDER — ASPIRIN 81 MG/1
81 TABLET ORAL DAILY
OUTPATIENT
Start: 2024-10-29

## 2024-10-28 RX ORDER — SODIUM CHLORIDE 0.9 % (FLUSH) 0.9 %
10 SYRINGE (ML) INJECTION AS NEEDED
OUTPATIENT
Start: 2024-10-28

## 2024-10-28 RX ORDER — ASPIRIN 81 MG/1
324 TABLET, CHEWABLE ORAL ONCE
OUTPATIENT
Start: 2024-10-28 | End: 2024-10-28

## 2024-10-28 NOTE — TELEPHONE ENCOUNTER
Caller: Lenny Esparza    Relationship to patient: Self    Best call back number: [phone]     Chief complaint: POSSIBLE BLOCKAGES PER [facility]    Type of visit: F/U APPT    Requested date: NEXT AVAILABLE        Additional notes:PLEASE CONTACT PATIENT WITH A SUITABLE DATE.

## 2024-10-28 NOTE — TELEPHONE ENCOUNTER
Pt was hospitalized at [facility] 10/25/2024. Pt had elevated troponin level. Pt had echo done as well. Pt's last stress test was at [facility] in July of 2023. They wished to keep him at the hospital, but pt wished to leave and follow up with Dr. Andre. Pt scheduled for appt in [place] Friday 11/1. Pt denies chest pain and SOB when hospitalized. Pt reports BP is regularly (110/70). Pt reports it was higher (150-160s) in the hospital. Pt reports feeling fatigued. Pt also reports being confused upon hospitalization.  Pt off of lisinopril. Pt taking Clonidine 0.1 mg 2 tablets in the morning. All other medications are reported as the same.  Please advise for further recommendations.

## 2024-10-28 NOTE — TELEPHONE ENCOUNTER
Pt notified of Dr. Andre's recommendations. PT agreeable to proceed with heart cath. [place] appointment will be canceled.

## 2024-11-05 ENCOUNTER — HOSPITAL ENCOUNTER (OUTPATIENT)
Facility: HOSPITAL | Age: 59
Setting detail: HOSPITAL OUTPATIENT SURGERY
Discharge: HOME | End: 2024-11-05
Attending: INTERNAL MEDICINE | Admitting: INTERNAL MEDICINE
Payer: MEDICARE

## 2024-11-05 ENCOUNTER — TELEPHONE (OUTPATIENT)
Dept: CARDIOLOGY | Facility: CLINIC | Age: 59
End: 2024-11-05
Payer: MEDICARE

## 2024-11-05 VITALS
WEIGHT: 251 LBS | TEMPERATURE: 97.3 F | SYSTOLIC BLOOD PRESSURE: 170 MMHG | OXYGEN SATURATION: 93 % | HEART RATE: 63 BPM | HEIGHT: 71 IN | DIASTOLIC BLOOD PRESSURE: 65 MMHG | RESPIRATION RATE: 16 BRPM | BODY MASS INDEX: 35.14 KG/M2

## 2024-11-05 DIAGNOSIS — R94.39 ABNORMAL RESULT OF OTHER CARDIOVASCULAR FUNCTION STUDY: ICD-10-CM

## 2024-11-05 DIAGNOSIS — R74.8 CARDIAC ENZYMES ELEVATED: ICD-10-CM

## 2024-11-05 LAB
ACT BLD: 263 SECONDS (ref 82–152)
ANION GAP SERPL CALCULATED.3IONS-SCNC: 11 MMOL/L (ref 5–15)
BUN SERPL-MCNC: 13 MG/DL (ref 6–20)
BUN/CREAT SERPL: 16.7 (ref 7–25)
CALCIUM SPEC-SCNC: 9.3 MG/DL (ref 8.6–10.5)
CHLORIDE SERPL-SCNC: 99 MMOL/L (ref 98–107)
CHOLEST SERPL-MCNC: 203 MG/DL (ref 0–200)
CO2 SERPL-SCNC: 26 MMOL/L (ref 22–29)
CREAT SERPL-MCNC: 0.78 MG/DL (ref 0.76–1.27)
DEPRECATED RDW RBC AUTO: 37.1 FL (ref 37–54)
EGFRCR SERPLBLD CKD-EPI 2021: 104 ML/MIN/1.73
ERYTHROCYTE [DISTWIDTH] IN BLOOD BY AUTOMATED COUNT: 12.9 % (ref 12.3–15.4)
GLUCOSE SERPL-MCNC: 112 MG/DL (ref 65–99)
HBA1C MFR BLD: 6.2 % (ref 4.8–5.6)
HCT VFR BLD AUTO: 46 % (ref 37.5–51)
HDLC SERPL-MCNC: 41 MG/DL (ref 40–60)
HGB BLD-MCNC: 15.6 G/DL (ref 13–17.7)
LDLC SERPL CALC-MCNC: 123 MG/DL (ref 0–100)
LDLC/HDLC SERPL: 2.88 {RATIO}
MCH RBC QN AUTO: 27.3 PG (ref 26.6–33)
MCHC RBC AUTO-ENTMCNC: 33.9 G/DL (ref 31.5–35.7)
MCV RBC AUTO: 80.4 FL (ref 79–97)
PLATELET # BLD AUTO: 178 10*3/MM3 (ref 140–450)
PMV BLD AUTO: 12.1 FL (ref 6–12)
POTASSIUM SERPL-SCNC: 4.1 MMOL/L (ref 3.5–5.2)
RBC # BLD AUTO: 5.72 10*6/MM3 (ref 4.14–5.8)
SODIUM SERPL-SCNC: 136 MMOL/L (ref 136–145)
TRIGL SERPL-MCNC: 220 MG/DL (ref 0–150)
VLDLC SERPL-MCNC: 39 MG/DL (ref 5–40)
WBC NRBC COR # BLD AUTO: 7.55 10*3/MM3 (ref 3.4–10.8)

## 2024-11-05 PROCEDURE — C1769 GUIDE WIRE: HCPCS | Performed by: INTERNAL MEDICINE

## 2024-11-05 PROCEDURE — 25010000002 LIDOCAINE PF 1% 1 % SOLUTION: Performed by: INTERNAL MEDICINE

## 2024-11-05 PROCEDURE — 83036 HEMOGLOBIN GLYCOSYLATED A1C: CPT | Performed by: HOSPITALIST

## 2024-11-05 PROCEDURE — 25010000002 FENTANYL CITRATE (PF) 50 MCG/ML SOLUTION: Performed by: INTERNAL MEDICINE

## 2024-11-05 PROCEDURE — 93799 UNLISTED CV SVC/PROCEDURE: CPT | Performed by: INTERNAL MEDICINE

## 2024-11-05 PROCEDURE — C1894 INTRO/SHEATH, NON-LASER: HCPCS | Performed by: INTERNAL MEDICINE

## 2024-11-05 PROCEDURE — 25010000002 MIDAZOLAM PER 1 MG: Performed by: INTERNAL MEDICINE

## 2024-11-05 PROCEDURE — 93458 L HRT ARTERY/VENTRICLE ANGIO: CPT | Performed by: INTERNAL MEDICINE

## 2024-11-05 PROCEDURE — 85347 COAGULATION TIME ACTIVATED: CPT

## 2024-11-05 PROCEDURE — 93571 IV DOP VEL&/PRESS C FLO 1ST: CPT | Performed by: INTERNAL MEDICINE

## 2024-11-05 PROCEDURE — 85027 COMPLETE CBC AUTOMATED: CPT | Performed by: HOSPITALIST

## 2024-11-05 PROCEDURE — 80048 BASIC METABOLIC PNL TOTAL CA: CPT | Performed by: HOSPITALIST

## 2024-11-05 PROCEDURE — 25010000002 HEPARIN (PORCINE) PER 1000 UNITS: Performed by: INTERNAL MEDICINE

## 2024-11-05 PROCEDURE — 25810000003 SODIUM CHLORIDE 0.9 % SOLUTION: Performed by: INTERNAL MEDICINE

## 2024-11-05 PROCEDURE — 36415 COLL VENOUS BLD VENIPUNCTURE: CPT

## 2024-11-05 PROCEDURE — 80061 LIPID PANEL: CPT | Performed by: HOSPITALIST

## 2024-11-05 PROCEDURE — 25510000001 IOPAMIDOL PER 1 ML: Performed by: INTERNAL MEDICINE

## 2024-11-05 PROCEDURE — 25010000002 NICARDIPINE 2.5 MG/ML SOLUTION: Performed by: INTERNAL MEDICINE

## 2024-11-05 RX ORDER — SODIUM CHLORIDE 0.9 % (FLUSH) 0.9 %
10 SYRINGE (ML) INJECTION AS NEEDED
Status: DISCONTINUED | OUTPATIENT
Start: 2024-11-05 | End: 2024-11-05 | Stop reason: HOSPADM

## 2024-11-05 RX ORDER — AMLODIPINE BESYLATE 2.5 MG/1
2.5 TABLET ORAL DAILY
COMMUNITY
End: 2024-11-11 | Stop reason: SDUPTHER

## 2024-11-05 RX ORDER — CLONIDINE HYDROCHLORIDE 0.1 MG/1
0.1 TABLET ORAL 2 TIMES DAILY
COMMUNITY

## 2024-11-05 RX ORDER — ASPIRIN 81 MG/1
324 TABLET, CHEWABLE ORAL ONCE
Status: COMPLETED | OUTPATIENT
Start: 2024-11-05 | End: 2024-11-05

## 2024-11-05 RX ORDER — FENTANYL CITRATE 50 UG/ML
INJECTION, SOLUTION INTRAMUSCULAR; INTRAVENOUS
Status: DISCONTINUED | OUTPATIENT
Start: 2024-11-05 | End: 2024-11-05 | Stop reason: HOSPADM

## 2024-11-05 RX ORDER — ACETAMINOPHEN 325 MG/1
650 TABLET ORAL EVERY 4 HOURS PRN
Status: DISCONTINUED | OUTPATIENT
Start: 2024-11-05 | End: 2024-11-05 | Stop reason: HOSPADM

## 2024-11-05 RX ORDER — LIDOCAINE HYDROCHLORIDE 10 MG/ML
INJECTION, SOLUTION EPIDURAL; INFILTRATION; INTRACAUDAL; PERINEURAL
Status: DISCONTINUED | OUTPATIENT
Start: 2024-11-05 | End: 2024-11-05 | Stop reason: HOSPADM

## 2024-11-05 RX ORDER — SODIUM CHLORIDE 0.9 % (FLUSH) 0.9 %
10 SYRINGE (ML) INJECTION EVERY 12 HOURS SCHEDULED
Status: DISCONTINUED | OUTPATIENT
Start: 2024-11-05 | End: 2024-11-05 | Stop reason: HOSPADM

## 2024-11-05 RX ORDER — ASPIRIN 81 MG/1
81 TABLET ORAL DAILY
Status: DISCONTINUED | OUTPATIENT
Start: 2024-11-06 | End: 2024-11-05 | Stop reason: HOSPADM

## 2024-11-05 RX ORDER — PREGABALIN 150 MG/1
150 CAPSULE ORAL 3 TIMES DAILY
COMMUNITY

## 2024-11-05 RX ORDER — IOPAMIDOL 755 MG/ML
INJECTION, SOLUTION INTRAVASCULAR
Status: DISCONTINUED | OUTPATIENT
Start: 2024-11-05 | End: 2024-11-05 | Stop reason: HOSPADM

## 2024-11-05 RX ORDER — NITROGLYCERIN 0.4 MG/1
0.4 TABLET SUBLINGUAL
Status: DISCONTINUED | OUTPATIENT
Start: 2024-11-05 | End: 2024-11-05 | Stop reason: HOSPADM

## 2024-11-05 RX ORDER — HEPARIN SODIUM 1000 [USP'U]/ML
INJECTION, SOLUTION INTRAVENOUS; SUBCUTANEOUS
Status: DISCONTINUED | OUTPATIENT
Start: 2024-11-05 | End: 2024-11-05 | Stop reason: HOSPADM

## 2024-11-05 RX ORDER — MIDAZOLAM HYDROCHLORIDE 1 MG/ML
INJECTION, SOLUTION INTRAMUSCULAR; INTRAVENOUS
Status: DISCONTINUED | OUTPATIENT
Start: 2024-11-05 | End: 2024-11-05 | Stop reason: HOSPADM

## 2024-11-05 RX ADMIN — ASPIRIN 81 MG 324 MG: 81 TABLET ORAL at 11:44

## 2024-11-05 NOTE — H&P
[facility]   [address], [address]  [place], [place], [address]    [phone]  WW[facility]           HISTORY AND PHYSICAL NOTE    Patient Care Team:  Patient Care Team:  Dustin Whitney MD as PCP - General (Internal Medicine)      Chief complaint: Chest pain.          Subjective:     Cardiac focused problem list:  CAD  STEMI 10/2018, status post PCI to LAD and then staged PCI with multiple stents to the RCA, 11/2018  [facility], 11/09/2018:  50% diffuse proximal to mid RCA stenosis. 50% distal RCA to RPL last stenosis, and 90% discrete ostial RPDA stenosis. Found to be functionally significant by iFR, now status post multiple tandem stents. JULI in the proximal to mid RCA, mid to distal RCA, distal RCA to RPDA and distal RCA into the ostial RPLS.   Stress test 10/03/2022:  Medium-sized infarct located in the apex with no ischemia. Intermediate risk study.   Stress echo, 7/29/2023:  LVEF 47%. Fixed inferior defect from mid to apex.   Echocardiogram 10/25/2024:  VLEF 60-65%. No regional wall motion abnormalities. Dilated RA. Mild MR. Aortic valve sclerosis.   Hypertension  Hyperlipidemia  LV thrombus, 2018  Echocardiogram, 10/11/2018:  LVEF 50%. Mild LVH. Borderline dilated LV. Hypokinetic LV wall segments including mid anterior, apical anterior, apical inferior, apical septal, apex, hypokinetic and mid anteroseptal. Grade Ia diastolic dysfunction.   Echocardiogram 5/31/2019:  LVEF 50%. Hypokinetic LV wall segments including apical inferior, apical septal and apex. No evidence of LV thrombus.   Chronic back pain  PRESTON  Restless leg syndrome    HPI:      Lenny Esparza is a 59 y.o. male.  Patient with recent hospital admission at [facility] for pneumonia with significant elevated troponin.  Echocardiogram revealing EF 60-65%, no regional wall motion abnormalities, dilated RA and mild MR.  Patient reports progressive fatigue and weakness in the last several  months.  Most recent stress test was last year, intermediate risk study.  Records from [place] reviewed by Dr. Andre, recommended repeat cath.  Patient presents today for left heart cath +/- PCI.     Review of Systems:  As noted in the HPI    PFSH:  Patient Active Problem List   Diagnosis    Essential hypertension    Mixed hyperlipidemia    Chronic back pain    Coronary artery disease involving native coronary artery of native heart without angina pectoris    LV (left ventricular) mural thrombus    Cardiac enzymes elevated    Abnormal result of other cardiovascular function study       No current facility-administered medications on file prior to encounter.     Current Outpatient Medications on File Prior to Encounter   Medication Sig Dispense Refill    allopurinol (ZYLOPRIM) 100 MG tablet Take 1 tablet by mouth Daily As Needed (gout flare).      ALPRAZolam (XANAX) 0.5 MG tablet Take 1 tablet by mouth At Night As Needed for Anxiety or Sleep.      amLODIPine (NORVASC) 2.5 MG tablet Take 1 tablet by mouth Daily.      aspirin 81 MG chewable tablet Chew 1 tablet Daily. 30 tablet 11    cloNIDine (CATAPRES) 0.1 MG tablet Take 1 tablet by mouth 2 (Two) Times a Day.      colchicine 0.6 MG tablet Take 1 tablet by mouth Daily As Needed (gout flare).      Dulaglutide (Trulicity) 3 MG/0.5ML solution auto-injector Inject  under the skin into the appropriate area as directed 1 (One) Time Per Week.      esomeprazole (nexIUM) 40 MG capsule Daily As Needed.      fluticasone (FLONASE) 50 MCG/ACT nasal spray Administer 2 sprays into the nostril(s) as directed by provider Daily As Needed.      metoprolol tartrate (LOPRESSOR) 25 MG tablet TAKE 1/2 TABLET BY MOUTH EVERY 12 HOURS 30 tablet 0    pregabalin (Lyrica) 150 MG capsule Take 1 capsule by mouth 3 times a day.      TESTOSTERONE IM Inject 1 dose into the appropriate muscle as directed by prescriber Every 14 (Fourteen) Days.      Xarelto 20 MG tablet Daily.      [DISCONTINUED]  Ozempic, 0.25 or 0.5 MG/DOSE, 2 MG/1.5ML solution pen-injector 1 (One) Time Per Week.      Evolocumab 140 MG/ML solution auto-injector Inject 1 mL under the skin into the appropriate area as directed Every 14 (Fourteen) Days. 2 pen 11    HYDROcodone-acetaminophen (NORCO)  MG per tablet Take 1 tablet by mouth 3 (Three) Times a Day.      nitroglycerin (NITROSTAT) 0.4 MG SL tablet 1 under the tongue as needed for angina, may repeat q5mins for up three doses 20 tablet 0    O2 (OXYGEN) Inhale 2 L/min Every Night.      [DISCONTINUED] escitalopram (LEXAPRO) 10 MG tablet Daily.      [DISCONTINUED] gabapentin (NEURONTIN) 600 MG tablet Take 600 mg by mouth 3 (Three) Times a Day.      [DISCONTINUED] gabapentin (NEURONTIN) 800 MG tablet Take 800 mg by mouth Every 8 (Eight) Hours As Needed.      [DISCONTINUED] lisinopril (PRINIVIL,ZESTRIL) 30 MG tablet Daily.         Social History     Socioeconomic History    Marital status:    Tobacco Use    Smoking status: Never    Smokeless tobacco: Current     Types: Chew    Tobacco comments:     1 can chew daily    Substance and Sexual Activity    Alcohol use: Yes     Comment: rare    Drug use: No    Sexual activity: Defer            Objective:     Vital Sign Min/Max for last 24 hours  Temp  Min: 97.3 °F (36.3 °C)  Max: 97.3 °F (36.3 °C)   BP  Min: 116/84  Max: 127/84   Pulse  Min: 74  Max: 74   Resp  Min: 18  Max: 18   SpO2  Min: 96 %  Max: 96 %   No data recorded    No intake or output data in the 24 hours ending 11/05/24 1239        Vitals:    11/05/24 1124   BP:    Pulse: 74   Resp: 18   Temp: 97.3 °F (36.3 °C)   SpO2: 96%     CONSTITUTIONAL: No acute distress  RESPIRATORY: Normal effort. Clear to auscultation bilaterally without wheezing or rales  CARDIOVASCULAR: Regular rate and rhythm with normal S1 and S2. Without murmur.  PERIPHERAL VASCULAR: No carotid bruit bilaterally.  Normal radial pulse.  Right radial Barbeau type a. There is no lower extremity edema  bilaterally.    Labs and radiologic results:  Today's results were reviewed by myself.    Cardiac Data:    Results for orders placed during the hospital encounter of 09/30/22    Adult Transthoracic Echo Complete W/ Cont if Necessary Per Protocol    Interpretation Summary  · Left ventricular ejection fraction appears to be 51 - 55%. Left ventricular systolic function is low normal.  · Left ventricular diastolic function is consistent with (grade I) impaired relaxation.           Assessment and Plan:     Problem list:    Cardiac enzymes elevated    Abnormal result of other cardiovascular function study      ASSESSMENT:  CAD  Prior STEMI 10/2018 status post PCI to LAD and staged PCI with multiple stents to the RCA.   Stress test last year, intermediate risk   Recent OSH admit for pneumonia with severely elevated troponin and chest pain.   Hypertension   Hyperlipidemia   Current   History of LV thrombus, 2018   COPD     PLAN:  Left heart catheterization +/- PCI via right radial approach with Dr. Andre. Right radial Barbeau type a.  Labs reviewed.    The risks, benefits, and alternatives of the procedure have been reviewed and the patient wishes to proceed.   Last dose of Xarelto was 11/03/2024.  Further recommendations to follow procedure.     Electronically signed by LINNETTE Giles, 11/05/24, 12:55 PM EST.

## 2024-11-06 ENCOUNTER — HOSPITAL ENCOUNTER (OUTPATIENT)
Dept: CARDIOLOGY | Facility: HOSPITAL | Age: 59
Discharge: HOME | End: 2024-11-06
Payer: MEDICARE

## 2024-11-06 ENCOUNTER — SPECIALTY PHARMACY (OUTPATIENT)
Dept: CARDIOLOGY | Facility: CLINIC | Age: 59
End: 2024-11-06
Payer: MEDICARE

## 2024-11-06 DIAGNOSIS — Z00.6 ENCOUNTER FOR EXAMINATION FOR NORMAL COMPARISON AND CONTROL IN CLINICAL RESEARCH PROGRAM: ICD-10-CM

## 2024-11-08 ENCOUNTER — TELEPHONE (OUTPATIENT)
Dept: CARDIOLOGY | Facility: CLINIC | Age: 59
End: 2024-11-08
Payer: MEDICARE

## 2024-11-08 NOTE — TELEPHONE ENCOUNTER
Pt called back. We discussed his Repatha. Pt has been taking amlodipine 10 mg daily (discharged from [place] with it). Our discharge summary shows he was discharged from our hospital with a prescription of 2.5 mg. Pt reports  BP has been normal. Pt taking clonidine as prescribed. Please advise if you want him on this dose.

## 2024-11-11 RX ORDER — AMLODIPINE BESYLATE 10 MG/1
10 TABLET ORAL DAILY
Qty: 90 TABLET | Refills: 3 | Status: SHIPPED | OUTPATIENT
Start: 2024-11-11

## 2024-11-27 ENCOUNTER — TELEPHONE (OUTPATIENT)
Dept: CARDIOLOGY | Facility: CLINIC | Age: 59
End: 2024-11-27
Payer: MEDICARE

## 2025-01-31 ENCOUNTER — SPECIALTY PHARMACY (OUTPATIENT)
Dept: CARDIOLOGY | Facility: CLINIC | Age: 60
End: 2025-01-31
Payer: MEDICARE

## 2025-02-05 ENCOUNTER — SPECIALTY PHARMACY (OUTPATIENT)
Facility: HOSPITAL | Age: 60
End: 2025-02-05
Payer: MEDICARE

## 2025-02-05 NOTE — PROGRESS NOTES
Specialty Pharmacy Patient Management Program  Cardiology Initial Assessment     Lenny Esparza was referred by a Cardiology provider to the Cardiology Patient Management program offered by [facility] for Hyperlipidemia on 02/05/25.  An initial outreach was conducted, including assessment of therapy appropriateness and specialty medication education for Repatha. The patient was introduced to services offered by [facility], including: regular assessments, refill coordination, mail order delivery options, prior authorization maintenance, and financial assistance programs as applicable. The patient was also provided with contact information for the pharmacy team.     Insurance Coverage & Financial Support  [facility]     Relevant Past Medical History and Comorbidities  Relevant medical history and concomitant health conditions were discussed with the patient. The patient's chart has been reviewed for relevant past medical history and comorbid conditions and updated as necessary.  Past Medical History:   Diagnosis Date    Arthritis     back     Coronary artery disease     Gout     Hyperlipidemia     Hypertension     LV (left ventricular) mural thrombus     xarelto per dr palacio    Lymphoma     remission x2 years     MI (myocardial infarction) 10/2018    stent per dr palacio    Restless leg syndrome     Sleep apnea     in the process of getting cpap and more testing      Social History     Socioeconomic History    Marital status:    Tobacco Use    Smoking status: Never    Smokeless tobacco: Current     Types: Chew    Tobacco comments:     1 can chew daily    Substance and Sexual Activity    Alcohol use: Yes     Comment: rare    Drug use: No    Sexual activity: Defer       Problem list reviewed by Debbi Bo, PharmD on 2/5/2025 at 10:35 AM    Allergies  Known allergies and reactions were discussed with the patient. The patient's chart has been reviewed for  allergy  information and updated as necessary.   Allergies   Allergen Reactions    Statins Myalgia       Allergies reviewed by Debbi Bo, PharmD on 2/5/2025 at 10:35 AM    Relevant Laboratory Values  Relevant laboratory values were discussed with the patient. The following specialty medication dose adjustment(s) are recommended: No changes    Lab Results   Component Value Date    GLUCOSE 112 (H) 11/05/2024    CALCIUM 9.3 11/05/2024     11/05/2024    K 4.1 11/05/2024    CO2 26.0 11/05/2024    CL 99 11/05/2024    BUN 13 11/05/2024    CREATININE 0.78 11/05/2024    EGFRIFNONA 86 11/07/2018    BCR 16.7 11/05/2024    ANIONGAP 11.0 11/05/2024     Lab Results   Component Value Date    CHOL 203 (H) 11/05/2024    TRIG 220 (H) 11/05/2024    HDL 41 11/05/2024     (H) 11/05/2024         Current Medication List  This medication list has been reviewed with the patient and evaluated for any interactions or necessary modifications/recommendations, and updated to include all prescription medications, OTC medications, and supplements the patient is currently taking.  This list reflects what is contained in the patient's profile, which has also been marked as reviewed to communicate to other providers it is the most up to date version of the patient's current medication therapy.     Current Outpatient Medications:     Evolocumab (REPATHA) solution auto-injector SureClick injection, Inject 1 mL under the skin into the appropriate area as directed Every 14 (Fourteen) Days., Disp: 6 mL, Rfl: 3    allopurinol (ZYLOPRIM) 100 MG tablet, Take 1 tablet by mouth Daily As Needed (gout flare)., Disp: , Rfl:     ALPRAZolam (XANAX) 0.5 MG tablet, Take 1 tablet by mouth At Night As Needed for Anxiety or Sleep., Disp: , Rfl:     amLODIPine (NORVASC) 10 MG tablet, Take 1 tablet by mouth Daily., Disp: 90 tablet, Rfl: 3    aspirin 81 MG chewable tablet, Chew 1 tablet Daily., Disp: 30 tablet, Rfl: 11    cloNIDine (CATAPRES) 0.1 MG tablet, Take  1 tablet by mouth 2 (Two) Times a Day., Disp: , Rfl:     colchicine 0.6 MG tablet, Take 1 tablet by mouth Daily As Needed (gout flare)., Disp: , Rfl:     Dulaglutide (Trulicity) 3 MG/0.5ML solution auto-injector, Inject  under the skin into the appropriate area as directed 1 (One) Time Per Week., Disp: , Rfl:     esomeprazole (nexIUM) 40 MG capsule, Daily As Needed., Disp: , Rfl:     fluticasone (FLONASE) 50 MCG/ACT nasal spray, Administer 2 sprays into the nostril(s) as directed by provider Daily As Needed., Disp: , Rfl:     HYDROcodone-acetaminophen (NORCO)  MG per tablet, Take 1 tablet by mouth 3 (Three) Times a Day., Disp: , Rfl:     metoprolol tartrate (LOPRESSOR) 25 MG tablet, TAKE 1/2 TABLET BY MOUTH EVERY 12 HOURS, Disp: 30 tablet, Rfl: 0    nitroglycerin (NITROSTAT) 0.4 MG SL tablet, 1 under the tongue as needed for angina, may repeat q5mins for up three doses, Disp: 20 tablet, Rfl: 0    O2 (OXYGEN), Inhale 2 L/min Every Night., Disp: , Rfl:     pregabalin (Lyrica) 150 MG capsule, Take 1 capsule by mouth 3 times a day., Disp: , Rfl:     TESTOSTERONE IM, Inject 1 dose into the appropriate muscle as directed by prescriber Every 14 (Fourteen) Days., Disp: , Rfl:     Xarelto 20 MG tablet, Daily., Disp: , Rfl:     Medicines reviewed by Debbi Bo, PharmD on 2/5/2025 at 10:35 AM    Drug Interactions  None    Initial Education Provided for Specialty Medication  The patient has been provided with the following education and any applicable administration techniques (i.e. self-injection) have been demonstrated for the therapies indicated. All questions and concerns have been addressed prior to the patient receiving the medication, and the patient has verbalized comprehension of the education and any materials provided. Additional patient education shall be provided and documented upon request by the patient, provider, or payer.    REPATHA® (evolocumab)  Medication Expectations   Why am I taking this  medication? You are taking Repatha to lower your “bad” cholesterol (LDL-C). This medication can be used in adults with high blood cholesterol including primary hyperlipidemia and familial hypercholesterolemia.    What should I expect while on this medication? You should expect to see your cholesterol improve over time. Specifically, you should see your LDL-C decrease.    How does the medication work? Repatha works by blocking a protein called PCSK9 that contributes to high levels of bad cholesterol. It helps increase your liver's ability to remove bad cholesterol from your blood.     How long will I be on this medication for? The amount of time you will be on this medication will be determined by your doctor based on your cholesterol and/or your risk of having a cardiac event. You will most likely be on this medication or another cholesterol medication throughout your lifetime. Do not abruptly stop this medication without talking to your doctor first.    How do I take this medication? Take as directed on your prescription label. Repatha is injected under the skin (subcutaneously) of your stomach, thigh, or upper arm. This medication is usually given one or twice a month.   What are some possible side effects? The most common side effects of Repatha include redness, itching, swelling, or pain/tenderness at the injection site, symptoms of the common cold, flu or flu-like symptoms or back pain.    What happens if I miss a dose? If you miss a dose, take it as soon as you remember if it is within 7 days from the usual day of administration then resume your original schedule. If it is beyond 7 days and you use the ling-2-week dose, skip the missed dose and resume your normal dosing schedule.If it is beyond 7 days and you use the once-monthly dose, inject the dose and start a new schedule based on that date.      Medication Safety   What are things I should warn my doctor immediately about? Talk to your doctor if you are  pregnant, planning to become pregnant, or breastfeeding. Stop the medication and tell your doctor or seek emergency medical help if you notice any signs/symptoms of an allergic reaction (severe rash, redness, hives, severe itching, trouble breathing, or swelling of the face, lips, or tongue). If you have a rubber or latex allergy, you should not use the Repatha SureClick® Autoinjector pen or the prefilled syringe, please notify your doctor or pharmacist.   What are things that I should be cautious of? Be cautious of any side effects from this medication. Talk to your doctor if any new ones develop or aren't getting better.   What are some medications that can interact with this one? There are no known significant drug interactions with Repatha. Always tell your doctor or pharmacist immediately if you start taking any new medications, including over-the-counter medications, vitamins, and herbal supplements.      Medication Storage/Handling   How should I handle this medication? Do not shake or expose the pens, cartridges, or syringes to extreme heat or direct sunlight. Keep this medication out of reach of pets/children. Allow medication to warm at room temperature prior to administration.   How does this medication need to be stored? Store unused pens, cartridges, or syringes in the refrigerator in the original cartons to protect from light. If needed, Repatha may be kept at room temperature in the original carton for up to 30 days. Do not freeze.    How should I dispose of this medication? All the Repatha devices are single-dose and should be discarded in a sharps container after use. If your doctor decides to stop this medication, take to your local police station for proper disposal. Some pharmacies also have take-back bins for medication drop-off.      Resources/Support   How can I remind myself to take this medication? You can download reminder apps to help you manage your refills. You may also set an alarm on  your phone to remind you to take your dose.    Is financial support available?  [employer] can provide co-pay cards if you have commercial insurance or patient assistance if you have Medicare or no insurance.    Which vaccines are recommended for me? Talk to your doctor about these vaccines: Flu, Coronavirus (COVID-19), Pneumococcal (pneumonia), Tdap, Hepatitis B, Zoster (shingles)       Adherence and Self-Administration  Adherence related to the patient's specialty therapy was discussed with the patient. The Adherence segment of this outreach has been reviewed and updated.     Is there a concern with patient's ability to self administer the medication correctly and without issue?: No  Were any potential barriers to adherence identified during the initial assessment or patient education?: No  Are there any concerns regarding the patient's understanding of the importance of medication adherence?: No  Methods for Supporting Patient Adherence and/or Self-Administration: None- patient currently taking, but has trouble getting at local pharmacy    Open Medication Therapy Problems  No medication therapy recommendations to display    Goals of Therapy  Goals related to the patient's specialty therapy were discussed with the patient. The Patient Goals segment of this outreach has been reviewed and updated.   Goals Addressed Today        Specialty Pharmacy General Goal      LDL Goal < 100 mg/dL    Lab Results   Component Value Date     (H) 11/05/2024     11/08/2018     10/11/2018                  Reassessment Plan & Follow-Up  1. Medication Therapy Changes: Continue Repatha 140mg subcutaneous every 14 days   2. Related Plans, Therapy Recommendations, or Therapy Problems to Be Addressed: None  3. Pharmacist to perform regular assessments no more than (6) months from the previous assessment.  4. Care Coordinator to set up future refill outreaches, coordinate prescription delivery, and escalate clinical questions  to pharmacist.  5. Welcome information and patient satisfaction survey to be sent by specialty pharmacy team with patient's initial fill.    Attestation  Therapeutic appropriateness: Appropriate   I attest the patient was actively involved in and has agreed to the above plan of care. If the prescribed therapy is at any point deemed not appropriate based on the current or future assessments, a consultation will be initiated with the patient's specialty care provider to determine the best course of action. The revised plan of therapy will be documented along with any required assessments and/or additional patient education provided.     Debbi Bo, PharmD, [facility]  Clinical Specialty Pharmacist, Cardiology  2/5/2025  10:36 EST

## 2025-04-25 ENCOUNTER — SPECIALTY PHARMACY (OUTPATIENT)
Dept: CARDIOLOGY | Facility: CLINIC | Age: 60
End: 2025-04-25
Payer: MEDICARE

## 2025-04-25 NOTE — PROGRESS NOTES
Specialty Pharmacy Patient Management Program  One-Time Clinical Outreach     Lenny Esparza is a 59 y.o. male seen by a Cardiology provider for Hyperlipidemia and enrolled in the Cardiology Patient Management program offered by [facility].      Patient's insurance requires he use [facility]. Refill sent there and patient is aware.     Debbi Bo, PharmD, [facility]  Clinical Specialty Pharmacist, Cardiology  4/25/2025  11:41 EDT

## 2025-04-28 ENCOUNTER — TELEPHONE (OUTPATIENT)
Dept: CARDIOLOGY | Facility: CLINIC | Age: 60
End: 2025-04-28
Payer: MEDICARE

## (undated) DEVICE — CATH DIAG EXPO .045 FL3  5F 100CM

## (undated) DEVICE — MODEL AT P65, P/N 701554-001KIT CONTENTS: HAND CONTROLLER, 3-WAY HIGH-PRESSURE STOPCOCK WITH ROTATING END AND PREMIUM HIGH-PRESSURE TUBING: Brand: ANGIOTOUCH® KIT

## (undated) DEVICE — DEV COMP RAD PRELUDESYNC 24CM

## (undated) DEVICE — CATH INTRAVAS ULTRASND EAGLE EYE 2.9FR

## (undated) DEVICE — PK CATH CARD 10

## (undated) DEVICE — ADULT, W/LG. BACK PAD, RADIOTRANSPARENT ELEMENT AND LEAD WIRE COMPATIBLE W/: Brand: DEFIBRILLATION ELECTRODES

## (undated) DEVICE — Device: Brand: ASAHI SION BLUE

## (undated) DEVICE — GW PRESSUREWIRE X WIRELESS FFR 175CM

## (undated) DEVICE — CVR PROB ULTRASND/TRANSD W/GEL 7X11IN STRL

## (undated) DEVICE — DEV INFL MONARCH 25W

## (undated) DEVICE — MINI TREK CORONARY DILATATION CATHETER 2.0 MM X 15 MM / RAPID-EXCHANGE: Brand: MINI TREK

## (undated) DEVICE — CATH DIAG EXPO M/ PK 5F FL4/FR4 PIG

## (undated) DEVICE — NC TREK CORONARY DILATATION CATHETER 3.5 MM X 25 MM / RAPID-EXCHANGE: Brand: NC TREK

## (undated) DEVICE — MODEL BT2000 P/N 700287-012KIT CONTENTS: MANIFOLD WITH SALINE AND CONTRAST PORTS, SALINE TUBING WITH SPIKE AND HAND SYRINGE, TRANSDUCER: Brand: BT2000 AUTOMATED MANIFOLD KIT

## (undated) DEVICE — CATH DIAG EXPO .045 FL3.5 5F 100CM

## (undated) DEVICE — LN INJ CONTRST FLXCIL HP F/M LL 1200PSI48

## (undated) DEVICE — NC TREK CORONARY DILATATION CATHETER 2.5 MM X 15 MM / RAPID-EXCHANGE: Brand: NC TREK

## (undated) DEVICE — GW PERIPH GUIDERIGHT STD/EXCHNG/J/TIP SS 0.035IN 5X260CM

## (undated) DEVICE — TREK CORONARY DILATATION CATHETER 3.0 MM X 15 MM / RAPID-EXCHANGE: Brand: TREK

## (undated) DEVICE — NC TREK CORONARY DILATATION CATHETER 3.5 MM X 15 MM / RAPID-EXCHANGE: Brand: NC TREK

## (undated) DEVICE — GUIDE CATHETER: Brand: MACH1™

## (undated) DEVICE — GW FC FLOP/TP .035 260CM 3MM

## (undated) DEVICE — KT MANIFOLD CATHLAB CUST

## (undated) DEVICE — MINI TREK CORONARY DILATATION CATHETER 1.50 MM X 15 MM / RAPID-EXCHANGE: Brand: MINI TREK

## (undated) DEVICE — GW PT 2 MS .014 185CM STR TP

## (undated) DEVICE — KT VLV HEMO MAP ACC PLS LG/BORE MTL/INTRO W/TORQ/DEV

## (undated) DEVICE — INTRO SHEATH PRELUDE IDEAL SPRNG COIL 021 6F 23X80CM

## (undated) DEVICE — TR BAND RADIAL ARTERY COMPRESSION DEVICE: Brand: TR BAND

## (undated) DEVICE — GLIDESHEATH SLENDER STAINLESS STEEL KIT: Brand: GLIDESHEATH SLENDER

## (undated) DEVICE — GW PRESS VERRATA STR 185CM 10185P

## (undated) DEVICE — ST EXT IV SMRTSTE 2VLV FIX M LL 6ML 41